# Patient Record
Sex: FEMALE | Race: WHITE | NOT HISPANIC OR LATINO | Employment: OTHER | URBAN - METROPOLITAN AREA
[De-identification: names, ages, dates, MRNs, and addresses within clinical notes are randomized per-mention and may not be internally consistent; named-entity substitution may affect disease eponyms.]

---

## 2021-05-25 ENCOUNTER — RECORDS - HEALTHEAST (OUTPATIENT)
Dept: ADMINISTRATIVE | Facility: CLINIC | Age: 81
End: 2021-05-25

## 2022-05-18 ENCOUNTER — OFFICE VISIT (OUTPATIENT)
Dept: PLASTIC SURGERY | Facility: AMBULATORY SURGERY CENTER | Age: 82
End: 2022-05-18
Payer: MEDICARE

## 2022-05-18 VITALS — WEIGHT: 173 LBS | BODY MASS INDEX: 30.65 KG/M2 | HEIGHT: 63 IN

## 2022-05-18 DIAGNOSIS — T85.43XA SILICONE LEAKAGE FROM BREAST IMPLANT, INITIAL ENCOUNTER: Primary | ICD-10-CM

## 2022-05-18 PROCEDURE — 99204 OFFICE O/P NEW MOD 45 MIN: CPT | Performed by: PLASTIC SURGERY

## 2022-05-18 RX ORDER — ROPINIROLE 0.25 MG/1
1 TABLET, FILM COATED ORAL 3 TIMES DAILY
Status: ON HOLD | COMMUNITY
End: 2022-05-26 | Stop reason: DRUGHIGH

## 2022-05-18 RX ORDER — FUROSEMIDE 20 MG
1 TABLET ORAL DAILY
COMMUNITY
Start: 2022-04-25

## 2022-05-18 RX ORDER — IBUPROFEN 600 MG/1
TABLET, FILM COATED ORAL
COMMUNITY

## 2022-05-18 RX ORDER — GABAPENTIN 300 MG/1
300 CAPSULE ORAL 3 TIMES DAILY
COMMUNITY
Start: 2021-05-06

## 2022-05-18 NOTE — PROGRESS NOTES
Chief complaint:  Deflated breast implants after breast reconstruction    History of present illness:  This is a 82 year old lady who presents with deflated bilateral subpectoral implants, after previous history of bilateral breast reconstruction after right breast cancer.  This patient was diagnosed 35 years ago, with right breast cancer.  She underwent bilateral mastectomies followed by placement of bilateral subpectoral tissue expanders that later were transitioned to permanent breast implants.    Approximately 2-months ago patient has noticed that the implants have lost volume and she was complaining of pain on bilateral axillary regions, especially on the left axillary region.    With these symptoms patient was evaluated with a left axillary ultrasound that showed left breast ruptured implant with free silicone in the subpectoral space and also left axillary region.    With these findings and her current symptoms patient has been referred to me for possible bilateral breast implant explantations.  Patient does not wish any implant replacement.    Past medical history:  History reviewed. No pertinent past medical history.     Right breast cancer    Past surgical history:  Bilateral mastectomies, bilateral first stage breast reconstruction with tissue expanders, bilateral second stage breast reconstruction with permanent breast implant in the subpectoral plane.    Allergies:  No known drug allergies    Medications:    Current Outpatient Medications:      furosemide (LASIX) 20 MG tablet, Take 1 tablet by mouth daily, Disp: , Rfl:      gabapentin (NEURONTIN) 300 MG capsule, gabapentin 300 mg capsule, Disp: , Rfl:      ibuprofen (ADVIL/MOTRIN) 600 MG tablet, ibuprofen 600 mg tablet  TAKE 1 TABLET BY MOUTH FOUR TIMES DAILY AS NEEDED FOR PAIN, Disp: , Rfl:      rOPINIRole (REQUIP) 0.25 MG tablet, ropinirole 0.25 mg tablet  TAKE 2 TABLETS BY MOUTH THREE TIMES DAILY., Disp: , Rfl:      TRAZodone (DESYREL) 50 MG tablet,  "Take 50 mg by mouth At Bedtime., Disp: , Rfl:      Alendronate Sodium (FOSAMAX PO), Take  by mouth every 7 days. (Patient not taking: Reported on 5/18/2022), Disp: , Rfl:      carbidopa-levodopa (SINEMET)  MG per tablet, Take 1 tablet by mouth 3 times daily. (Patient not taking: Reported on 5/18/2022), Disp: , Rfl:     Family history:  Mother with breast cancer.    GYN history:  G3, P3    Social History:  Denies tobacco, drinks alcohol occasionally    Review of systems:  General ROS: No complaints or constitutional symptoms  Skin: No complaints or symptoms   Hematologic/Lymphatic: No symptoms or complaints  Psychiatric: No symptoms or complaints  Endocrine: No excessive fatigue, no hypermetabolic symptoms reported  Respiratory ROS: No cough, shortness of breath, or wheezing  Cardiovascular ROS: No chest pain or dyspnea on exertion  Breast ROS: Complains of deflated bilateral breast implants with pain in the left axillary region  Gastrointestinal ROS: No abdominal pain, nausea, diarrhea, or constipation  Musculoskeletal ROS: No recent injuries reported  Neurological ROS: No focal neurologic defects reported.      Physical exam:  Ht 1.6 m (5' 3\")   Wt 78.5 kg (173 lb)   BMI 30.65 kg/m    General: Alert, cooperative, appears stated age   Skin: Skin color, texture, turgor normal, no rashes or lesions   Lymphatic: No obvious adenopathy, no swelling   Eyes: No scleral icterus, pupils equal  HENT: No traumatic injury to the head or face, no gross abnormalities  Lungs: Normal respiratory effort, breath sounds equal bilaterally  Heart: Regular rate and rhythm  Breasts: Bilateral reconstructed breasts that are completely asymmetrical to each other.  There is absence of bilateral nipple areolar complexes.  The right reconstructed breast implant presents with more volume compared to the left reconstructed breast that looks quite deflated with excessive loose and redundant skin, especially in the inferior pole of the " left reconstructed breast.  The breast implants are subpectoral bilaterally with quite obvious animation deformity.  Both implants present with Baker 2 capsular contracture.  They present with well-healed transverse mastectomy scars.  There were no palpable axillary lymphadenopathies bilaterally.  There is no peau d'orange.  Abdomen: Soft, non-distended and non-tender to palpation  Neurologic: Grossly intact                              ASSESSMENT:    This is a 82 year old with history of right breast cancer status post implant-based subpectoral breast reconstruction, now presenting with bilateral deflated and ruptured breast implants.     Patient does not wish any reconstruction.  Patient would like to have these implants removed.     PLAN:     Patient will be scheduled for bilateral breast implant explantations, with possible capsulectomies bilaterally and most likely excision of redundant skin after explantation.    I will obtain preoperative cardiac risk assessment as well as bilateral breast ultrasounds due to the fact that she only had a left axillary ultrasound.    I have explained to the patient the risks of surgery and they include but are not limited to: scarring, infection, bleeding, hematoma, seroma, the fact that she will not have any volume whatsoever on the anterior aspect of her chest secondary to the fact that she will have explantation of both breast implants.  Patient has  acknowledge all these risks and has agreed to proceed.    Time spent with the patient 45 minutes.      Regino Ramirez MD, FACS   Diplomate American Board of Plastic Surgery  Diplomate American Board of Surgery  [facility]  Division of Plastic & Reconstructive Surgery  Office: [phone]   5/18/2022 at 3:59 PM

## 2022-05-18 NOTE — H&P (VIEW-ONLY)
Chief complaint:  Deflated breast implants after breast reconstruction    History of present illness:  This is a 82 year old lady who presents with deflated bilateral subpectoral implants, after previous history of bilateral breast reconstruction after right breast cancer.  This patient was diagnosed 35 years ago, with right breast cancer.  She underwent bilateral mastectomies followed by placement of bilateral subpectoral tissue expanders that later were transitioned to permanent breast implants.    Approximately 2-months ago patient has noticed that the implants have lost volume and she was complaining of pain on bilateral axillary regions, especially on the left axillary region.    With these symptoms patient was evaluated with a left axillary ultrasound that showed left breast ruptured implant with free silicone in the subpectoral space and also left axillary region.    With these findings and her current symptoms patient has been referred to me for possible bilateral breast implant explantations.  Patient does not wish any implant replacement.    Past medical history:  History reviewed. No pertinent past medical history.     Right breast cancer    Past surgical history:  Bilateral mastectomies, bilateral first stage breast reconstruction with tissue expanders, bilateral second stage breast reconstruction with permanent breast implant in the subpectoral plane.    Allergies:  No known drug allergies    Medications:    Current Outpatient Medications:      furosemide (LASIX) 20 MG tablet, Take 1 tablet by mouth daily, Disp: , Rfl:      gabapentin (NEURONTIN) 300 MG capsule, gabapentin 300 mg capsule, Disp: , Rfl:      ibuprofen (ADVIL/MOTRIN) 600 MG tablet, ibuprofen 600 mg tablet  TAKE 1 TABLET BY MOUTH FOUR TIMES DAILY AS NEEDED FOR PAIN, Disp: , Rfl:      rOPINIRole (REQUIP) 0.25 MG tablet, ropinirole 0.25 mg tablet  TAKE 2 TABLETS BY MOUTH THREE TIMES DAILY., Disp: , Rfl:      TRAZodone (DESYREL) 50 MG tablet,  "Take 50 mg by mouth At Bedtime., Disp: , Rfl:      Alendronate Sodium (FOSAMAX PO), Take  by mouth every 7 days. (Patient not taking: Reported on 5/18/2022), Disp: , Rfl:      carbidopa-levodopa (SINEMET)  MG per tablet, Take 1 tablet by mouth 3 times daily. (Patient not taking: Reported on 5/18/2022), Disp: , Rfl:     Family history:  Mother with breast cancer.    GYN history:  G3, P3    Social History:  Denies tobacco, drinks alcohol occasionally    Review of systems:  General ROS: No complaints or constitutional symptoms  Skin: No complaints or symptoms   Hematologic/Lymphatic: No symptoms or complaints  Psychiatric: No symptoms or complaints  Endocrine: No excessive fatigue, no hypermetabolic symptoms reported  Respiratory ROS: No cough, shortness of breath, or wheezing  Cardiovascular ROS: No chest pain or dyspnea on exertion  Breast ROS: Complains of deflated bilateral breast implants with pain in the left axillary region  Gastrointestinal ROS: No abdominal pain, nausea, diarrhea, or constipation  Musculoskeletal ROS: No recent injuries reported  Neurological ROS: No focal neurologic defects reported.      Physical exam:  Ht 1.6 m (5' 3\")   Wt 78.5 kg (173 lb)   BMI 30.65 kg/m    General: Alert, cooperative, appears stated age   Skin: Skin color, texture, turgor normal, no rashes or lesions   Lymphatic: No obvious adenopathy, no swelling   Eyes: No scleral icterus, pupils equal  HENT: No traumatic injury to the head or face, no gross abnormalities  Lungs: Normal respiratory effort, breath sounds equal bilaterally  Heart: Regular rate and rhythm  Breasts: Bilateral reconstructed breasts that are completely asymmetrical to each other.  There is absence of bilateral nipple areolar complexes.  The right reconstructed breast implant presents with more volume compared to the left reconstructed breast that looks quite deflated with excessive loose and redundant skin, especially in the inferior pole of the " left reconstructed breast.  The breast implants are subpectoral bilaterally with quite obvious animation deformity.  Both implants present with Baker 2 capsular contracture.  They present with well-healed transverse mastectomy scars.  There were no palpable axillary lymphadenopathies bilaterally.  There is no peau d'orange.  Abdomen: Soft, non-distended and non-tender to palpation  Neurologic: Grossly intact                              ASSESSMENT:    This is a 82 year old with history of right breast cancer status post implant-based subpectoral breast reconstruction, now presenting with bilateral deflated and ruptured breast implants.     Patient does not wish any reconstruction.  Patient would like to have these implants removed.     PLAN:     Patient will be scheduled for bilateral breast implant explantations, with possible capsulectomies bilaterally and most likely excision of redundant skin after explantation.    I will obtain preoperative cardiac risk assessment as well as bilateral breast ultrasounds due to the fact that she only had a left axillary ultrasound.    I have explained to the patient the risks of surgery and they include but are not limited to: scarring, infection, bleeding, hematoma, seroma, the fact that she will not have any volume whatsoever on the anterior aspect of her chest secondary to the fact that she will have explantation of both breast implants.  Patient has  acknowledge all these risks and has agreed to proceed.    Time spent with the patient 45 minutes.      Regino Ramirez MD, FACS   Diplomate American Board of Plastic Surgery  Diplomate American Board of Surgery  [facility]  Division of Plastic & Reconstructive Surgery  Office: [phone]   5/18/2022 at 3:59 PM

## 2022-05-18 NOTE — Clinical Note
Hi ladies,  Please submit PA and schedule case as soon as possible. Patient wishes to have this surgery very soon.  Thanks so much,  ROBERT

## 2022-05-18 NOTE — LETTER
5/18/2022         RE: Danna Adams  [address] [id number]        Dear Colleague,    Thank you for referring your patient, Danna Adams, to the [facility]. Please see a copy of my visit note below.    Chief complaint:  Deflated breast implants after breast reconstruction    History of present illness:  This is a 82 year old lady who presents with deflated bilateral subpectoral implants, after previous history of bilateral breast reconstruction after right breast cancer.  This patient was diagnosed 35 years ago, with right breast cancer.  She underwent bilateral mastectomies followed by placement of bilateral subpectoral tissue expanders that later were transitioned to permanent breast implants.    Approximately 2-months ago patient has noticed that the implants have lost volume and she was complaining of pain on bilateral axillary regions, especially on the left axillary region.    With these symptoms patient was evaluated with a left axillary ultrasound that showed left breast ruptured implant with free silicone in the subpectoral space and also left axillary region.    With these findings and her current symptoms patient has been referred to me for possible bilateral breast implant explantations.  Patient does not wish any implant replacement.    Past medical history:  History reviewed. No pertinent past medical history.     Right breast cancer    Past surgical history:  Bilateral mastectomies, bilateral first stage breast reconstruction with tissue expanders, bilateral second stage breast reconstruction with permanent breast implant in the subpectoral plane.    Allergies:  No known drug allergies    Medications:    Current Outpatient Medications:      furosemide (LASIX) 20 MG tablet, Take 1 tablet by mouth daily, Disp: , Rfl:      gabapentin (NEURONTIN) 300 MG capsule, gabapentin 300 mg capsule, Disp: , Rfl:      ibuprofen (ADVIL/MOTRIN) 600 MG  "tablet, ibuprofen 600 mg tablet  TAKE 1 TABLET BY MOUTH FOUR TIMES DAILY AS NEEDED FOR PAIN, Disp: , Rfl:      rOPINIRole (REQUIP) 0.25 MG tablet, ropinirole 0.25 mg tablet  TAKE 2 TABLETS BY MOUTH THREE TIMES DAILY., Disp: , Rfl:      TRAZodone (DESYREL) 50 MG tablet, Take 50 mg by mouth At Bedtime., Disp: , Rfl:      Alendronate Sodium (FOSAMAX PO), Take  by mouth every 7 days. (Patient not taking: Reported on 5/18/2022), Disp: , Rfl:      carbidopa-levodopa (SINEMET)  MG per tablet, Take 1 tablet by mouth 3 times daily. (Patient not taking: Reported on 5/18/2022), Disp: , Rfl:     Family history:  Mother with breast cancer.    GYN history:  G3, P3    Social History:  Denies tobacco, drinks alcohol occasionally    Review of systems:  General ROS: No complaints or constitutional symptoms  Skin: No complaints or symptoms   Hematologic/Lymphatic: No symptoms or complaints  Psychiatric: No symptoms or complaints  Endocrine: No excessive fatigue, no hypermetabolic symptoms reported  Respiratory ROS: No cough, shortness of breath, or wheezing  Cardiovascular ROS: No chest pain or dyspnea on exertion  Breast ROS: Complains of deflated bilateral breast implants with pain in the left axillary region  Gastrointestinal ROS: No abdominal pain, nausea, diarrhea, or constipation  Musculoskeletal ROS: No recent injuries reported  Neurological ROS: No focal neurologic defects reported.      Physical exam:  Ht 1.6 m (5' 3\")   Wt 78.5 kg (173 lb)   BMI 30.65 kg/m    General: Alert, cooperative, appears stated age   Skin: Skin color, texture, turgor normal, no rashes or lesions   Lymphatic: No obvious adenopathy, no swelling   Eyes: No scleral icterus, pupils equal  HENT: No traumatic injury to the head or face, no gross abnormalities  Lungs: Normal respiratory effort, breath sounds equal bilaterally  Heart: Regular rate and rhythm  Breasts: Bilateral reconstructed breasts that are completely asymmetrical to each other.  " There is absence of bilateral nipple areolar complexes.  The right reconstructed breast implant presents with more volume compared to the left reconstructed breast that looks quite deflated with excessive loose and redundant skin, especially in the inferior pole of the left reconstructed breast.  The breast implants are subpectoral bilaterally with quite obvious animation deformity.  Both implants present with Baker 2 capsular contracture.  They present with well-healed transverse mastectomy scars.  There were no palpable axillary lymphadenopathies bilaterally.  There is no peau d'orange.  Abdomen: Soft, non-distended and non-tender to palpation  Neurologic: Grossly intact                              ASSESSMENT:    This is a 82 year old with history of right breast cancer status post implant-based subpectoral breast reconstruction, now presenting with bilateral deflated and ruptured breast implants.     Patient does not wish any reconstruction.  Patient would like to have these implants removed.     PLAN:     Patient will be scheduled for bilateral breast implant explantations, with possible capsulectomies bilaterally and most likely excision of redundant skin after explantation.    I will obtain preoperative cardiac risk assessment as well as bilateral breast ultrasounds due to the fact that she only had a left axillary ultrasound.    I have explained to the patient the risks of surgery and they include but are not limited to: scarring, infection, bleeding, hematoma, seroma, the fact that she will not have any volume whatsoever on the anterior aspect of her chest secondary to the fact that she will have explantation of both breast implants.  Patient has  acknowledge all these risks and has agreed to proceed.    Time spent with the patient 45 minutes.      Regino Ramirez MD, FACS   Diplomate American Board of Plastic Surgery  Diplomate American Board of Surgery  [facility]  Division of  Plastic & Reconstructive Surgery  Office: [phone]   5/18/2022 at 3:59 PM        Again, thank you for allowing me to participate in the care of your patient.        Sincerely,        Regino Ramirez MD

## 2022-05-19 ENCOUNTER — TELEPHONE (OUTPATIENT)
Dept: PLASTIC SURGERY | Facility: AMBULATORY SURGERY CENTER | Age: 82
End: 2022-05-19
Payer: MEDICARE

## 2022-05-19 DIAGNOSIS — Z11.59 ENCOUNTER FOR SCREENING FOR OTHER VIRAL DISEASES: Primary | ICD-10-CM

## 2022-05-19 NOTE — TELEPHONE ENCOUNTER
Pt called back.  She is excited to get done next week and can make all dates work.  To summarize:  *5/20- Cardiac appt.  *5/20-Preop Physical-she will schedule this and is sure she can get in.  *5/23-Covid Pre Proc Test   *5/23-Bilateral Breast U/S  *5/26-Surgery  *6/2-Post op appt w/Dr Ramirez    She expressed understanding of the need for a /adult to stay 24hour postop with her.    She had no additional question. Will send detailed patient instructions by personal email to pt at her request.    Pt will call if any additional questions arise.      ARASELI

## 2022-05-19 NOTE — TELEPHONE ENCOUNTER
Left patient a message to call back.  Dr Ramirez said this pt wants to be scheduled asap, and because we've had a cancellation we can do on Thursday 5/26.   She does have cardiac clearance scheduled for tomorrow, I scheduled Covid test for Monday, and did advise she immediately contact her primary physician to schedule her preop physical, hopefully for tomorrow or Monday. She also needs bilateral breast ultrasound which is scheduled at 1:45pm Monday 5/23.  I asked her to call me back asap and provided my direct line.         [facility]     [facility]  Surgery Clinic [facility]  [facility] Clinic - [facility]  [address] [place] [id number]

## 2022-05-19 NOTE — PATIENT INSTRUCTIONS
We've received instruction to get you scheduled for bilateral explantation breast implants with Dr Ramirez. We have that arranged as follows:     Surgery Date: May 26 2022  Location:  [place] [address]   Arrival Time: 8am  (unless instructed otherwise by the pre-op nurse)      Additional Appointments:    Cardiac Clearance: Scheduled 5/20/2022  Pre-op Physical- You are scheduling this and trying for 5/20/2022  Covid-19 Pre Procedure Test: 5/23/2022 at 10:15am  Bilateral Breast Ultrasound: 5/23/2022 at 1:45pm (they are trying to move this closer to the lab appointment to save you a trip)  Post op Appointment: 6/2/2022 at 9:15am with Dr Ramirez    Prep Instructions:     Please schedule a pre-op physical with your primary care doctor. Call them right away to schedule this.    PCR-Rated COVID19 testing is required within 4 days of surgery. We have this scheduled for you at [facility], [address] [place] [address], [facility] Floor on May 23 at 10:15am . Follow the specific instructions you receive by Meenakshi. If your test is positive, your surgery will be canceled.     Follow fasting instructions provided by the preop nurse to prevent cancellation.  The preop nurse will provide your actual arrival time and can answer any general questions you may have.  They typically call you the day before surgery.    Your surgeon prefers no blood thinners including aspirin for one week prior to surgery but you must verify this is safe for you with your prescribing doctor before stopping.     Visitor restrictions are in place due to the pandemic. One visitor is allowed for adult surgical patients. Please verify this with the pre-op nurse when they call before surgery because it is subject to change.    If you are going home the same day of surgery, you need an adult to drive you home and stay with you 24 hours after surgery.  You cannot use public transportation or medi cab services.    You will be screened  for high-risk exposure to Covid-19 during the pre-op call.  We encourage you to quarantine yourself away from any Covid-19 people for 10 days before surgery to avoid possible last minute cancellations.   When you arrive to the hospital, you will again be screened for COVID19 symptoms. If you screen positive, your surgery will need to be postponed.     The community is experiencing a surge in COVID19 hospital admissions which is impacting bed availability at all hospitals. If you are being admitted overnight or longer following surgery, please be aware that your procedure could be cancelled as a result.     We always encourage you to notify your insurance any time you have something scheduled including surgery. The number is usually right on the back of your insurance card. Please call [facility] Cost of Care at [phone] if you need pricing information.       Call our office if you have any questions! Thank you!

## 2022-05-20 ENCOUNTER — OFFICE VISIT (OUTPATIENT)
Dept: CARDIOLOGY | Facility: CLINIC | Age: 82
End: 2022-05-20
Payer: MEDICARE

## 2022-05-20 VITALS
WEIGHT: 172 LBS | HEART RATE: 74 BPM | RESPIRATION RATE: 16 BRPM | BODY MASS INDEX: 30.48 KG/M2 | DIASTOLIC BLOOD PRESSURE: 86 MMHG | SYSTOLIC BLOOD PRESSURE: 132 MMHG | HEIGHT: 63 IN

## 2022-05-20 DIAGNOSIS — T85.43XA SILICONE LEAKAGE FROM BREAST IMPLANT, INITIAL ENCOUNTER: ICD-10-CM

## 2022-05-20 DIAGNOSIS — Z01.810 PRE-OPERATIVE CARDIOVASCULAR EXAMINATION: Primary | ICD-10-CM

## 2022-05-20 PROCEDURE — 93000 ELECTROCARDIOGRAM COMPLETE: CPT | Performed by: INTERNAL MEDICINE

## 2022-05-20 PROCEDURE — 99204 OFFICE O/P NEW MOD 45 MIN: CPT | Performed by: INTERNAL MEDICINE

## 2022-05-20 NOTE — LETTER
2022    David Dennis MD  [address]   [facility] [address]    RE: Danna Adams       Dear Colleague,     I had the pleasure of seeing Danna Adams in the [facility].    HEART CARE ENCOUNTER CONSULTATON NOTE      [facility]  [phone]      Assessment/Recommendations   Assessment:   1.  Preoperative cardiovascular surgical evaluation for reconstructive breast surgery.  2.  History of breast cancer  3.  Premature atrial contractions  4.  Mild lower extremity edema, chronic    1.  Preoperative cardiovascular risk assessment:   Assessment of preoperative cardiac risk: He has no active cardiac conditions (unstable coronary syndromes, decompensated HF, significant arrhythmias, or severe valvular disease), has no known coronary artery disease, has no clinical risk factors (ischemic heart disease, prior heart failure, cerebrovascular disease, diabetes mellitus, and renal insufficiency), and has a functional capacity > than 4 METs.     Normal renal function:     Surgical Recommendation(s):   1. Based on clinical risk and cardiac condition it is recommended that the patient proceeds with operation with no further cardiac testing or interventions at this time.   2.  Premature atrial contractions are asymptomatic do not need treatment  3.  No indication for beta-blocker  4.  No indication for statin  5.  Continue as needed Lasix for lower extremity edema.  No history of congestive heart failure.  Normal BNP level in past.     Twelve-lead EK2022.  Personally viewed.  normal sinus rhythm.  Frequent premature atrial contractions.  Ventricular rate 75 bpm.  OH interval normal at 150 ms.  QRS duration 84 ms.  QTc interval 408 ms.  Other than PACs normal EKG.    Today's clinic visit entailed:  Review of prior external note(s) from - CareEverywhere information from PCP reviewed  Review of the result(s) of each unique test -  Labs  The following tests were independently interpreted by me as noted in my documentation: ECG  Ordering of each unique test  Prescription drug management  The level of medical decision making during this visit was of moderate complexity.         History of Present Illness/Subjective    HPI: Danna Adams is a 82 year old female no prior significant cardiac history who presents to [facility] cardiology clinic for preoperative cardiovascular surgical risk assessment.    Per the patient she is undergoing reconstructive breast surgery due to related to pain at her breast implants after reconstruction from breast cancer over 30 years ago.      Currently the patient denies any anginal chest pain, denies any progressive dyspnea on exertion, no syncope no palpitations.  She has no history of coronary artery disease.  She has a history of valvular heart disease.  There are no clinically significant murmurs on examination.  She denies any claudication symptoms.  She has a history of normal renal function.    She is able to walk at a relatively brisk pace with no symptoms.  Usually when ambulating stairs she does have some mild dyspnea which been chronic and nonprogressive.  She denies any orthopnea or PND symptoms.  She denies any lightheadedness with prolonged standing.    No prior cardiac history.  No prior cardiac surgery.  No history of congestive heart failure no prior cardiac stents.  No history of cardiac arrhythmias.  He is not on a beta-blocker or statin therapy.  Takes occasional Lasix as needed for lower extremity edema which is only affecting her right lower ankle.    No prior cardiac complications to general anesthesia.    Most recent labs were reviewed.  In 2021 she had normal renal function.  She had a normal BNP level at the time that she had an mild swelling in her right ankle.  Reviewed labs from [facility].    Reviewed outpatient note by surgical team.       Physical Examination  Review  "of Systems   Vitals: /86 (BP Location: Left arm, Patient Position: Sitting, Cuff Size: Adult Regular)   Pulse 74   Resp 16   Ht 1.6 m (5' 3\")   Wt 78 kg (172 lb)   BMI 30.47 kg/m    BMI= Body mass index is 30.47 kg/m .  Wt Readings from Last 3 Encounters:   05/20/22 78 kg (172 lb)   05/18/22 78.5 kg (173 lb)   04/07/16 65.8 kg (145 lb)           ENT/Mouth: membranes moist, no oral lesions or bleeding gums.      EYES:  no scleral icterus, normal conjunctivae       Chest/Lungs:   lungs are clear to auscultation, no rales or wheezing, no sternal scar, equal chest wall expansion    Cardiovascular:   Regular with ectopic beats. Normal first and second heart sounds with no murmurs, rubs, or gallops; the carotid, radial and posterior tibial pulses are intact, Jugular venous pressure normal, no pitting edema bilaterally    Abdomen:  no tenderness; bowel sounds are present   Extremities: no cyanosis or clubbing   Skin: no xanthelasma, warm.    Neurologic: normal  bilateral, no tremors     Psychiatric: alert and oriented x3, calm        Please refer above for cardiac ROS details.        Medical History  Surgical History Family History Social History   No past medical history on file.  Past Surgical History:   Procedure Laterality Date     FOOT SURGERY Right      JOINT REPLACEMENT Right     hip     MASTECTOMY Bilateral      ZZC TOTAL KNEE ARTHROPLASTY Left 4/12/2016    Procedure: KNEE TOTAL ARTHROPLASTY, LEFT;  Surgeon: Joseluis Smiley MD;  Location: [facility];  Service: Orthopedics     Family history of sudden cardiac death.   Social History     Socioeconomic History     Marital status:      Spouse name: Not on file     Number of children: Not on file     Years of education: Not on file     Highest education level: Not on file   Occupational History     Not on file   Tobacco Use     Smoking status: Never Smoker     Smokeless tobacco: Never Used   Substance and Sexual Activity     Alcohol use: Yes "     Drug use: Never     Sexual activity: Not on file   Other Topics Concern     Not on file   Social History Narrative     Not on file     Social Determinants of Health     Financial Resource Strain: Not on file   Food Insecurity: Not on file   Transportation Needs: Not on file   Physical Activity: Not on file   Stress: Not on file   Social Connections: Not on file   Intimate Partner Violence: Not on file   Housing Stability: Not on file           Medications  Allergies   Current Outpatient Medications   Medication Sig Dispense Refill     furosemide (LASIX) 20 MG tablet Take 1 tablet by mouth daily       gabapentin (NEURONTIN) 300 MG capsule gabapentin 300 mg capsule       ibuprofen (ADVIL/MOTRIN) 600 MG tablet ibuprofen 600 mg tablet   TAKE 1 TABLET BY MOUTH FOUR TIMES DAILY AS NEEDED FOR PAIN       rOPINIRole (REQUIP) 0.25 MG tablet ropinirole 0.25 mg tablet   TAKE 2 TABLETS BY MOUTH THREE TIMES DAILY.       TRAZodone (DESYREL) 50 MG tablet Take 50 mg by mouth At Bedtime.       Alendronate Sodium (FOSAMAX PO) Take  by mouth every 7 days. (Patient not taking: No sig reported)       carbidopa-levodopa (SINEMET)  MG per tablet Take 1 tablet by mouth 3 times daily. (Patient not taking: No sig reported)       No Known Allergies       Lab Results    Chemistry/lipid CBC Cardiac Enzymes/BNP/TSH/INR   No results for input(s): CHOL, HDL, LDL, TRIG, CHOLHDLRATIO in the last 38120 hours.  No results for input(s): LDL in the last 49765 hours.  Recent Labs   Lab Test 04/14/16  0533   CR 0.63   GFRESTIMATED >60     Recent Labs   Lab Test 04/14/16  0533 04/13/16  0600 04/12/16  0737   CR 0.63 0.64 0.65     No results for input(s): A1C in the last 82786 hours.       No results for input(s): WBC, HGB, HCT, MCV, PLT in the last 42521 hours.  No results for input(s): HGB in the last 54909 hours. No results for input(s): TROPONINI in the last 81911 hours.  No results for input(s): BNP, NTBNPI, NTBNP in the last 63608 hours.  No  results for input(s): TSH in the last 95016 hours.  No results for input(s): INR in the last 34433 hours.     Dk Solis DO     Thank you for allowing me to participate in the care of your patient.      Sincerely,     Dk Solis DO     [facility]  cc:   Regino Ramirez MD  [address] [address]

## 2022-05-20 NOTE — PROGRESS NOTES
HEART CARE ENCOUNTER CONSULTATON NOTE      M [facility]  [phone]      Assessment/Recommendations   Assessment:   1.  Preoperative cardiovascular surgical evaluation for reconstructive breast surgery.  2.  History of breast cancer  3.  Premature atrial contractions  4.  Mild lower extremity edema, chronic    1.  Preoperative cardiovascular risk assessment:   Assessment of preoperative cardiac risk: He has no active cardiac conditions (unstable coronary syndromes, decompensated HF, significant arrhythmias, or severe valvular disease), has no known coronary artery disease, has no clinical risk factors (ischemic heart disease, prior heart failure, cerebrovascular disease, diabetes mellitus, and renal insufficiency), and has a functional capacity > than 4 METs.     Normal renal function:     Surgical Recommendation(s):   1. Based on clinical risk and cardiac condition it is recommended that the patient proceeds with operation with no further cardiac testing or interventions at this time.   2.  Premature atrial contractions are asymptomatic do not need treatment  3.  No indication for beta-blocker  4.  No indication for statin  5.  Continue as needed Lasix for lower extremity edema.  No history of congestive heart failure.  Normal BNP level in past.     Twelve-lead EK2022.  Personally viewed.  normal sinus rhythm.  Frequent premature atrial contractions.  Ventricular rate 75 bpm.  OK interval normal at 150 ms.  QRS duration 84 ms.  QTc interval 408 ms.  Other than PACs normal EKG.    Today's clinic visit entailed:  Review of prior external note(s) from - CareEverywhere information from PCP reviewed  Review of the result(s) of each unique test - Labs  The following tests were independently interpreted by me as noted in my documentation: ECG  Ordering of each unique test  Prescription drug management  The level of medical decision making during this visit was of moderate complexity.      "    History of Present Illness/Subjective    HPI: Danna Adams is a 82 year old female no prior significant cardiac history who presents to [facility] cardiology clinic for preoperative cardiovascular surgical risk assessment.    Per the patient she is undergoing reconstructive breast surgery due to related to pain at her breast implants after reconstruction from breast cancer over 30 years ago.      Currently the patient denies any anginal chest pain, denies any progressive dyspnea on exertion, no syncope no palpitations.  She has no history of coronary artery disease.  She has a history of valvular heart disease.  There are no clinically significant murmurs on examination.  She denies any claudication symptoms.  She has a history of normal renal function.    She is able to walk at a relatively brisk pace with no symptoms.  Usually when ambulating stairs she does have some mild dyspnea which been chronic and nonprogressive.  She denies any orthopnea or PND symptoms.  She denies any lightheadedness with prolonged standing.    No prior cardiac history.  No prior cardiac surgery.  No history of congestive heart failure no prior cardiac stents.  No history of cardiac arrhythmias.  He is not on a beta-blocker or statin therapy.  Takes occasional Lasix as needed for lower extremity edema which is only affecting her right lower ankle.    No prior cardiac complications to general anesthesia.    Most recent labs were reviewed.  In 2021 she had normal renal function.  She had a normal BNP level at the time that she had an mild swelling in her right ankle.  Reviewed labs from [facility].    Reviewed outpatient note by surgical team.       Physical Examination  Review of Systems   Vitals: /86 (BP Location: Left arm, Patient Position: Sitting, Cuff Size: Adult Regular)   Pulse 74   Resp 16   Ht 1.6 m (5' 3\")   Wt 78 kg (172 lb)   BMI 30.47 kg/m    BMI= Body mass index is 30.47 kg/m .  Wt Readings " from Last 3 Encounters:   05/20/22 78 kg (172 lb)   05/18/22 78.5 kg (173 lb)   04/07/16 65.8 kg (145 lb)           ENT/Mouth: membranes moist, no oral lesions or bleeding gums.      EYES:  no scleral icterus, normal conjunctivae       Chest/Lungs:   lungs are clear to auscultation, no rales or wheezing, no sternal scar, equal chest wall expansion    Cardiovascular:   Regular with ectopic beats. Normal first and second heart sounds with no murmurs, rubs, or gallops; the carotid, radial and posterior tibial pulses are intact, Jugular venous pressure normal, no pitting edema bilaterally    Abdomen:  no tenderness; bowel sounds are present   Extremities: no cyanosis or clubbing   Skin: no xanthelasma, warm.    Neurologic: normal  bilateral, no tremors     Psychiatric: alert and oriented x3, calm        Please refer above for cardiac ROS details.        Medical History  Surgical History Family History Social History   No past medical history on file.  Past Surgical History:   Procedure Laterality Date     FOOT SURGERY Right      JOINT REPLACEMENT Right     hip     MASTECTOMY Bilateral      ZZC TOTAL KNEE ARTHROPLASTY Left 4/12/2016    Procedure: KNEE TOTAL ARTHROPLASTY, LEFT;  Surgeon: Joseluis Smiley MD;  Location: [facility];  Service: Orthopedics     Family history of sudden cardiac death.   Social History     Socioeconomic History     Marital status:      Spouse name: Not on file     Number of children: Not on file     Years of education: Not on file     Highest education level: Not on file   Occupational History     Not on file   Tobacco Use     Smoking status: Never Smoker     Smokeless tobacco: Never Used   Substance and Sexual Activity     Alcohol use: Yes     Drug use: Never     Sexual activity: Not on file   Other Topics Concern     Not on file   Social History Narrative     Not on file     Social Determinants of Health     Financial Resource Strain: Not on file   Food Insecurity: Not on file    Transportation Needs: Not on file   Physical Activity: Not on file   Stress: Not on file   Social Connections: Not on file   Intimate Partner Violence: Not on file   Housing Stability: Not on file           Medications  Allergies   Current Outpatient Medications   Medication Sig Dispense Refill     furosemide (LASIX) 20 MG tablet Take 1 tablet by mouth daily       gabapentin (NEURONTIN) 300 MG capsule gabapentin 300 mg capsule       ibuprofen (ADVIL/MOTRIN) 600 MG tablet ibuprofen 600 mg tablet   TAKE 1 TABLET BY MOUTH FOUR TIMES DAILY AS NEEDED FOR PAIN       rOPINIRole (REQUIP) 0.25 MG tablet ropinirole 0.25 mg tablet   TAKE 2 TABLETS BY MOUTH THREE TIMES DAILY.       TRAZodone (DESYREL) 50 MG tablet Take 50 mg by mouth At Bedtime.       Alendronate Sodium (FOSAMAX PO) Take  by mouth every 7 days. (Patient not taking: No sig reported)       carbidopa-levodopa (SINEMET)  MG per tablet Take 1 tablet by mouth 3 times daily. (Patient not taking: No sig reported)       No Known Allergies       Lab Results    Chemistry/lipid CBC Cardiac Enzymes/BNP/TSH/INR   No results for input(s): CHOL, HDL, LDL, TRIG, CHOLHDLRATIO in the last 80544 hours.  No results for input(s): LDL in the last 42989 hours.  Recent Labs   Lab Test 04/14/16  0533   CR 0.63   GFRESTIMATED >60     Recent Labs   Lab Test 04/14/16  0533 04/13/16  0600 04/12/16  0737   CR 0.63 0.64 0.65     No results for input(s): A1C in the last 90585 hours.       No results for input(s): WBC, HGB, HCT, MCV, PLT in the last 86066 hours.  No results for input(s): HGB in the last 73793 hours. No results for input(s): TROPONINI in the last 94964 hours.  No results for input(s): BNP, NTBNPI, NTBNP in the last 38647 hours.  No results for input(s): TSH in the last 99446 hours.  No results for input(s): INR in the last 95968 hours.     Dk Solis, DO

## 2022-05-21 LAB
ATRIAL RATE - MUSE: 75 BPM
DIASTOLIC BLOOD PRESSURE - MUSE: NORMAL MMHG
INTERPRETATION ECG - MUSE: NORMAL
P AXIS - MUSE: 32 DEGREES
PR INTERVAL - MUSE: 150 MS
QRS DURATION - MUSE: 84 MS
QT - MUSE: 366 MS
QTC - MUSE: 408 MS
R AXIS - MUSE: 23 DEGREES
SYSTOLIC BLOOD PRESSURE - MUSE: NORMAL MMHG
T AXIS - MUSE: 61 DEGREES
VENTRICULAR RATE- MUSE: 75 BPM

## 2022-05-23 ENCOUNTER — LAB (OUTPATIENT)
Dept: LAB | Facility: CLINIC | Age: 82
End: 2022-05-23
Payer: MEDICARE

## 2022-05-23 ENCOUNTER — ANCILLARY PROCEDURE (OUTPATIENT)
Dept: MAMMOGRAPHY | Facility: CLINIC | Age: 82
End: 2022-05-23
Attending: PLASTIC SURGERY
Payer: MEDICARE

## 2022-05-23 DIAGNOSIS — Z11.59 ENCOUNTER FOR SCREENING FOR OTHER VIRAL DISEASES: ICD-10-CM

## 2022-05-23 DIAGNOSIS — T85.43XA SILICONE LEAKAGE FROM BREAST IMPLANT, INITIAL ENCOUNTER: ICD-10-CM

## 2022-05-23 LAB — SARS-COV-2 RNA RESP QL NAA+PROBE: NEGATIVE

## 2022-05-23 PROCEDURE — 76642 ULTRASOUND BREAST LIMITED: CPT | Mod: 50

## 2022-05-23 PROCEDURE — U0003 INFECTIOUS AGENT DETECTION BY NUCLEIC ACID (DNA OR RNA); SEVERE ACUTE RESPIRATORY SYNDROME CORONAVIRUS 2 (SARS-COV-2) (CORONAVIRUS DISEASE [COVID-19]), AMPLIFIED PROBE TECHNIQUE, MAKING USE OF HIGH THROUGHPUT TECHNOLOGIES AS DESCRIBED BY CMS-2020-01-R: HCPCS

## 2022-05-23 PROCEDURE — U0005 INFEC AGEN DETEC AMPLI PROBE: HCPCS

## 2022-05-26 ENCOUNTER — HOSPITAL ENCOUNTER (OUTPATIENT)
Facility: HOSPITAL | Age: 82
Discharge: HOME | End: 2022-05-27
Attending: PLASTIC SURGERY | Admitting: PLASTIC SURGERY
Payer: MEDICARE

## 2022-05-26 ENCOUNTER — ANESTHESIA (OUTPATIENT)
Dept: SURGERY | Facility: HOSPITAL | Age: 82
End: 2022-05-26
Payer: MEDICARE

## 2022-05-26 ENCOUNTER — ANESTHESIA EVENT (OUTPATIENT)
Dept: SURGERY | Facility: HOSPITAL | Age: 82
End: 2022-05-26
Payer: MEDICARE

## 2022-05-26 DIAGNOSIS — Z90.13 STATUS POST BILATERAL MASTECTOMY: Primary | ICD-10-CM

## 2022-05-26 PROCEDURE — 250N000013 HC RX MED GY IP 250 OP 250 PS 637: Performed by: ANESTHESIOLOGY

## 2022-05-26 PROCEDURE — 250N000011 HC RX IP 250 OP 636: Performed by: PLASTIC SURGERY

## 2022-05-26 PROCEDURE — 250N000009 HC RX 250: Performed by: PLASTIC SURGERY

## 2022-05-26 PROCEDURE — 250N000011 HC RX IP 250 OP 636: Performed by: ANESTHESIOLOGY

## 2022-05-26 PROCEDURE — 250N000025 HC SEVOFLURANE, PER MIN: Performed by: PLASTIC SURGERY

## 2022-05-26 PROCEDURE — 88311 DECALCIFY TISSUE: CPT | Mod: TC | Performed by: PLASTIC SURGERY

## 2022-05-26 PROCEDURE — 258N000003 HC RX IP 258 OP 636: Performed by: ANESTHESIOLOGY

## 2022-05-26 PROCEDURE — 250N000013 HC RX MED GY IP 250 OP 250 PS 637: Performed by: PLASTIC SURGERY

## 2022-05-26 PROCEDURE — 250N000009 HC RX 250: Performed by: ANESTHESIOLOGY

## 2022-05-26 PROCEDURE — 999N000141 HC STATISTIC PRE-PROCEDURE NURSING ASSESSMENT: Performed by: PLASTIC SURGERY

## 2022-05-26 PROCEDURE — 19330 RMVL RUPTURED BREAST IMPLANT: CPT | Mod: 50 | Performed by: PLASTIC SURGERY

## 2022-05-26 PROCEDURE — 710N000009 HC RECOVERY PHASE 1, LEVEL 1, PER MIN: Performed by: PLASTIC SURGERY

## 2022-05-26 PROCEDURE — 250N000011 HC RX IP 250 OP 636

## 2022-05-26 PROCEDURE — 360N000076 HC SURGERY LEVEL 3, PER MIN: Performed by: PLASTIC SURGERY

## 2022-05-26 PROCEDURE — 258N000003 HC RX IP 258 OP 636: Performed by: PLASTIC SURGERY

## 2022-05-26 PROCEDURE — 272N000001 HC OR GENERAL SUPPLY STERILE: Performed by: PLASTIC SURGERY

## 2022-05-26 PROCEDURE — 370N000017 HC ANESTHESIA TECHNICAL FEE, PER MIN: Performed by: PLASTIC SURGERY

## 2022-05-26 RX ORDER — FENTANYL CITRATE 50 UG/ML
25 INJECTION, SOLUTION INTRAMUSCULAR; INTRAVENOUS EVERY 5 MIN PRN
Status: DISCONTINUED | OUTPATIENT
Start: 2022-05-26 | End: 2022-05-26 | Stop reason: HOSPADM

## 2022-05-26 RX ORDER — SODIUM CHLORIDE, SODIUM LACTATE, POTASSIUM CHLORIDE, CALCIUM CHLORIDE 600; 310; 30; 20 MG/100ML; MG/100ML; MG/100ML; MG/100ML
INJECTION, SOLUTION INTRAVENOUS CONTINUOUS
Status: DISCONTINUED | OUTPATIENT
Start: 2022-05-26 | End: 2022-05-26 | Stop reason: HOSPADM

## 2022-05-26 RX ORDER — ONDANSETRON 2 MG/ML
INJECTION INTRAMUSCULAR; INTRAVENOUS PRN
Status: DISCONTINUED | OUTPATIENT
Start: 2022-05-26 | End: 2022-05-26

## 2022-05-26 RX ORDER — PROPOFOL 10 MG/ML
INJECTION, EMULSION INTRAVENOUS PRN
Status: DISCONTINUED | OUTPATIENT
Start: 2022-05-26 | End: 2022-05-26

## 2022-05-26 RX ORDER — GABAPENTIN 300 MG/1
300 CAPSULE ORAL 3 TIMES DAILY PRN
Status: DISCONTINUED | OUTPATIENT
Start: 2022-05-26 | End: 2022-05-27 | Stop reason: HOSPADM

## 2022-05-26 RX ORDER — DEXTROSE MONOHYDRATE, SODIUM CHLORIDE, AND POTASSIUM CHLORIDE 50; 1.49; 4.5 G/1000ML; G/1000ML; G/1000ML
INJECTION, SOLUTION INTRAVENOUS CONTINUOUS
Status: DISCONTINUED | OUTPATIENT
Start: 2022-05-26 | End: 2022-05-27 | Stop reason: HOSPADM

## 2022-05-26 RX ORDER — FENTANYL CITRATE 50 UG/ML
50 INJECTION, SOLUTION INTRAMUSCULAR; INTRAVENOUS
Status: DISCONTINUED | OUTPATIENT
Start: 2022-05-26 | End: 2022-05-26 | Stop reason: HOSPADM

## 2022-05-26 RX ORDER — ONDANSETRON 2 MG/ML
4 INJECTION INTRAMUSCULAR; INTRAVENOUS EVERY 6 HOURS PRN
Status: DISCONTINUED | OUTPATIENT
Start: 2022-05-26 | End: 2022-05-27 | Stop reason: HOSPADM

## 2022-05-26 RX ORDER — MAGNESIUM SULFATE 4 G/50ML
4 INJECTION INTRAVENOUS ONCE
Status: COMPLETED | OUTPATIENT
Start: 2022-05-26 | End: 2022-05-26

## 2022-05-26 RX ORDER — AMOXICILLIN 250 MG
1-2 CAPSULE ORAL 2 TIMES DAILY
Qty: 30 TABLET | Refills: 0 | Status: SHIPPED | OUTPATIENT
Start: 2022-05-26

## 2022-05-26 RX ORDER — NALOXONE HYDROCHLORIDE 0.4 MG/ML
0.4 INJECTION, SOLUTION INTRAMUSCULAR; INTRAVENOUS; SUBCUTANEOUS
Status: DISCONTINUED | OUTPATIENT
Start: 2022-05-26 | End: 2022-05-27 | Stop reason: HOSPADM

## 2022-05-26 RX ORDER — HYDROMORPHONE HYDROCHLORIDE 1 MG/ML
0.5 INJECTION, SOLUTION INTRAMUSCULAR; INTRAVENOUS; SUBCUTANEOUS EVERY 5 MIN PRN
Status: DISCONTINUED | OUTPATIENT
Start: 2022-05-26 | End: 2022-05-26 | Stop reason: HOSPADM

## 2022-05-26 RX ORDER — OXYCODONE HYDROCHLORIDE 5 MG/1
10 TABLET ORAL EVERY 4 HOURS PRN
Status: DISCONTINUED | OUTPATIENT
Start: 2022-05-26 | End: 2022-05-27 | Stop reason: HOSPADM

## 2022-05-26 RX ORDER — ROPINIROLE 1 MG/1
1 TABLET, FILM COATED ORAL 3 TIMES DAILY
Status: DISCONTINUED | OUTPATIENT
Start: 2022-05-26 | End: 2022-05-27 | Stop reason: HOSPADM

## 2022-05-26 RX ORDER — PROCHLORPERAZINE MALEATE 5 MG
5 TABLET ORAL EVERY 6 HOURS PRN
Status: DISCONTINUED | OUTPATIENT
Start: 2022-05-26 | End: 2022-05-27 | Stop reason: HOSPADM

## 2022-05-26 RX ORDER — MEPERIDINE HYDROCHLORIDE 25 MG/ML
12.5 INJECTION INTRAMUSCULAR; INTRAVENOUS; SUBCUTANEOUS
Status: DISCONTINUED | OUTPATIENT
Start: 2022-05-26 | End: 2022-05-26 | Stop reason: HOSPADM

## 2022-05-26 RX ORDER — OXYCODONE HYDROCHLORIDE 5 MG/1
5 TABLET ORAL EVERY 4 HOURS PRN
Status: DISCONTINUED | OUTPATIENT
Start: 2022-05-26 | End: 2022-05-27 | Stop reason: HOSPADM

## 2022-05-26 RX ORDER — DEXAMETHASONE SODIUM PHOSPHATE 10 MG/ML
INJECTION, SOLUTION INTRAMUSCULAR; INTRAVENOUS PRN
Status: DISCONTINUED | OUTPATIENT
Start: 2022-05-26 | End: 2022-05-26

## 2022-05-26 RX ORDER — BISACODYL 10 MG
10 SUPPOSITORY, RECTAL RECTAL DAILY PRN
Status: DISCONTINUED | OUTPATIENT
Start: 2022-05-26 | End: 2022-05-27 | Stop reason: HOSPADM

## 2022-05-26 RX ORDER — NALOXONE HYDROCHLORIDE 0.4 MG/ML
0.2 INJECTION, SOLUTION INTRAMUSCULAR; INTRAVENOUS; SUBCUTANEOUS
Status: DISCONTINUED | OUTPATIENT
Start: 2022-05-26 | End: 2022-05-27 | Stop reason: HOSPADM

## 2022-05-26 RX ORDER — LEVOFLOXACIN 5 MG/ML
INJECTION, SOLUTION INTRAVENOUS
Status: DISCONTINUED
Start: 2022-05-26 | End: 2022-05-26 | Stop reason: HOSPADM

## 2022-05-26 RX ORDER — LIDOCAINE 40 MG/G
CREAM TOPICAL
Status: DISCONTINUED | OUTPATIENT
Start: 2022-05-26 | End: 2022-05-26 | Stop reason: HOSPADM

## 2022-05-26 RX ORDER — FENTANYL CITRATE 50 UG/ML
25 INJECTION, SOLUTION INTRAMUSCULAR; INTRAVENOUS
Status: CANCELLED | OUTPATIENT
Start: 2022-05-26

## 2022-05-26 RX ORDER — ONDANSETRON 4 MG/1
4 TABLET, ORALLY DISINTEGRATING ORAL EVERY 8 HOURS PRN
Qty: 9 TABLET | Refills: 0 | Status: SHIPPED | OUTPATIENT
Start: 2022-05-26

## 2022-05-26 RX ORDER — AMOXICILLIN 250 MG
1 CAPSULE ORAL 2 TIMES DAILY
Status: DISCONTINUED | OUTPATIENT
Start: 2022-05-26 | End: 2022-05-27 | Stop reason: HOSPADM

## 2022-05-26 RX ORDER — PROPOFOL 10 MG/ML
INJECTION, EMULSION INTRAVENOUS CONTINUOUS PRN
Status: DISCONTINUED | OUTPATIENT
Start: 2022-05-26 | End: 2022-05-26

## 2022-05-26 RX ORDER — CEPHALEXIN 500 MG/1
500 CAPSULE ORAL 3 TIMES DAILY
Qty: 15 CAPSULE | Refills: 0 | Status: SHIPPED | OUTPATIENT
Start: 2022-05-26 | End: 2022-05-31

## 2022-05-26 RX ORDER — CEFAZOLIN SODIUM 1 G/3ML
1 INJECTION, POWDER, FOR SOLUTION INTRAMUSCULAR; INTRAVENOUS EVERY 8 HOURS
Status: COMPLETED | OUTPATIENT
Start: 2022-05-26 | End: 2022-05-27

## 2022-05-26 RX ORDER — OXYCODONE HYDROCHLORIDE 5 MG/1
5 TABLET ORAL EVERY 4 HOURS PRN
Status: DISCONTINUED | OUTPATIENT
Start: 2022-05-26 | End: 2022-05-26 | Stop reason: HOSPADM

## 2022-05-26 RX ORDER — LIDOCAINE 40 MG/G
CREAM TOPICAL
Status: DISCONTINUED | OUTPATIENT
Start: 2022-05-26 | End: 2022-05-27 | Stop reason: HOSPADM

## 2022-05-26 RX ORDER — FENTANYL CITRATE 50 UG/ML
INJECTION, SOLUTION INTRAMUSCULAR; INTRAVENOUS PRN
Status: DISCONTINUED | OUTPATIENT
Start: 2022-05-26 | End: 2022-05-26

## 2022-05-26 RX ORDER — ONDANSETRON 2 MG/ML
4 INJECTION INTRAMUSCULAR; INTRAVENOUS EVERY 30 MIN PRN
Status: DISCONTINUED | OUTPATIENT
Start: 2022-05-26 | End: 2022-05-26 | Stop reason: HOSPADM

## 2022-05-26 RX ORDER — ONDANSETRON 4 MG/1
4 TABLET, ORALLY DISINTEGRATING ORAL EVERY 6 HOURS PRN
Status: DISCONTINUED | OUTPATIENT
Start: 2022-05-26 | End: 2022-05-27 | Stop reason: HOSPADM

## 2022-05-26 RX ORDER — POLYETHYLENE GLYCOL 3350 17 G/17G
17 POWDER, FOR SOLUTION ORAL DAILY
Status: DISCONTINUED | OUTPATIENT
Start: 2022-05-27 | End: 2022-05-27 | Stop reason: HOSPADM

## 2022-05-26 RX ORDER — ROPINIROLE 0.5 MG/1
1 TABLET, FILM COATED ORAL 3 TIMES DAILY
COMMUNITY

## 2022-05-26 RX ORDER — HYDROCODONE BITARTRATE AND ACETAMINOPHEN 5; 325 MG/1; MG/1
1-2 TABLET ORAL EVERY 4 HOURS PRN
Qty: 30 TABLET | Refills: 0 | Status: SHIPPED | OUTPATIENT
Start: 2022-05-26 | End: 2022-06-29

## 2022-05-26 RX ORDER — ACETAMINOPHEN 325 MG/1
650 TABLET ORAL EVERY 4 HOURS PRN
Status: DISCONTINUED | OUTPATIENT
Start: 2022-05-29 | End: 2022-05-27 | Stop reason: HOSPADM

## 2022-05-26 RX ORDER — ONDANSETRON 4 MG/1
4 TABLET, ORALLY DISINTEGRATING ORAL EVERY 30 MIN PRN
Status: DISCONTINUED | OUTPATIENT
Start: 2022-05-26 | End: 2022-05-26 | Stop reason: HOSPADM

## 2022-05-26 RX ORDER — ACETAMINOPHEN 325 MG/1
975 TABLET ORAL EVERY 8 HOURS
Status: DISCONTINUED | OUTPATIENT
Start: 2022-05-26 | End: 2022-05-27 | Stop reason: HOSPADM

## 2022-05-26 RX ORDER — LIDOCAINE HYDROCHLORIDE 10 MG/ML
INJECTION, SOLUTION INFILTRATION; PERINEURAL PRN
Status: DISCONTINUED | OUTPATIENT
Start: 2022-05-26 | End: 2022-05-26

## 2022-05-26 RX ORDER — CEFAZOLIN SODIUM/WATER 2 G/20 ML
2 SYRINGE (ML) INTRAVENOUS
Status: COMPLETED | OUTPATIENT
Start: 2022-05-26 | End: 2022-05-26

## 2022-05-26 RX ORDER — HYDROMORPHONE HCL IN WATER/PF 6 MG/30 ML
0.2 PATIENT CONTROLLED ANALGESIA SYRINGE INTRAVENOUS
Status: DISCONTINUED | OUTPATIENT
Start: 2022-05-26 | End: 2022-05-27 | Stop reason: HOSPADM

## 2022-05-26 RX ORDER — TRANEXAMIC ACID 100 MG/ML
INJECTION, SOLUTION INTRAVENOUS
Status: COMPLETED
Start: 2022-05-26 | End: 2022-05-26

## 2022-05-26 RX ORDER — HYDROMORPHONE HCL IN WATER/PF 6 MG/30 ML
0.4 PATIENT CONTROLLED ANALGESIA SYRINGE INTRAVENOUS
Status: DISCONTINUED | OUTPATIENT
Start: 2022-05-26 | End: 2022-05-27 | Stop reason: HOSPADM

## 2022-05-26 RX ADMIN — FENTANYL CITRATE 50 MCG: 50 INJECTION, SOLUTION INTRAMUSCULAR; INTRAVENOUS at 13:40

## 2022-05-26 RX ADMIN — POTASSIUM CHLORIDE, DEXTROSE MONOHYDRATE AND SODIUM CHLORIDE: 150; 5; 450 INJECTION, SOLUTION INTRAVENOUS at 18:28

## 2022-05-26 RX ADMIN — DEXAMETHASONE SODIUM PHOSPHATE 10 MG: 10 INJECTION, SOLUTION INTRAMUSCULAR; INTRAVENOUS at 10:13

## 2022-05-26 RX ADMIN — Medication 2 G: at 10:18

## 2022-05-26 RX ADMIN — OXYCODONE HYDROCHLORIDE 5 MG: 5 TABLET ORAL at 15:42

## 2022-05-26 RX ADMIN — PROCHLORPERAZINE EDISYLATE 5 MG: 5 INJECTION INTRAMUSCULAR; INTRAVENOUS at 19:58

## 2022-05-26 RX ADMIN — ACETAMINOPHEN 975 MG: 325 TABLET ORAL at 18:53

## 2022-05-26 RX ADMIN — HYDROMORPHONE HYDROCHLORIDE 0.4 MG: 0.2 INJECTION, SOLUTION INTRAMUSCULAR; INTRAVENOUS; SUBCUTANEOUS at 18:28

## 2022-05-26 RX ADMIN — PHENYLEPHRINE HYDROCHLORIDE 100 MCG: 10 INJECTION INTRAVENOUS at 12:09

## 2022-05-26 RX ADMIN — PROPOFOL 30 MG: 10 INJECTION, EMULSION INTRAVENOUS at 10:44

## 2022-05-26 RX ADMIN — SUGAMMADEX 200 MG: 100 INJECTION, SOLUTION INTRAVENOUS at 13:42

## 2022-05-26 RX ADMIN — HYDROMORPHONE HYDROCHLORIDE 0.5 MG: 1 INJECTION, SOLUTION INTRAMUSCULAR; INTRAVENOUS; SUBCUTANEOUS at 10:35

## 2022-05-26 RX ADMIN — SODIUM CHLORIDE, POTASSIUM CHLORIDE, SODIUM LACTATE AND CALCIUM CHLORIDE 250 ML: 600; 310; 30; 20 INJECTION, SOLUTION INTRAVENOUS at 08:42

## 2022-05-26 RX ADMIN — ROCURONIUM BROMIDE 10 MG: 50 INJECTION, SOLUTION INTRAVENOUS at 12:21

## 2022-05-26 RX ADMIN — TRANEXAMIC ACID 1 G: 100 INJECTION, SOLUTION INTRAVENOUS at 11:09

## 2022-05-26 RX ADMIN — GABAPENTIN 300 MG: 300 CAPSULE ORAL at 22:14

## 2022-05-26 RX ADMIN — PHENYLEPHRINE HYDROCHLORIDE 100 MCG: 10 INJECTION INTRAVENOUS at 12:30

## 2022-05-26 RX ADMIN — SODIUM CHLORIDE, POTASSIUM CHLORIDE, SODIUM LACTATE AND CALCIUM CHLORIDE: 600; 310; 30; 20 INJECTION, SOLUTION INTRAVENOUS at 12:25

## 2022-05-26 RX ADMIN — FENTANYL CITRATE 25 MCG: 50 INJECTION INTRAMUSCULAR; INTRAVENOUS at 14:22

## 2022-05-26 RX ADMIN — LIDOCAINE HYDROCHLORIDE 5 ML: 10 INJECTION, SOLUTION INFILTRATION; PERINEURAL at 10:13

## 2022-05-26 RX ADMIN — PROPOFOL 30 MCG/KG/MIN: 10 INJECTION, EMULSION INTRAVENOUS at 10:30

## 2022-05-26 RX ADMIN — ROCURONIUM BROMIDE 40 MG: 50 INJECTION, SOLUTION INTRAVENOUS at 10:13

## 2022-05-26 RX ADMIN — ROCURONIUM BROMIDE 10 MG: 50 INJECTION, SOLUTION INTRAVENOUS at 11:02

## 2022-05-26 RX ADMIN — SENNOSIDES AND DOCUSATE SODIUM 1 TABLET: 8.6; 5 TABLET ORAL at 20:58

## 2022-05-26 RX ADMIN — ONDANSETRON 4 MG: 2 INJECTION INTRAMUSCULAR; INTRAVENOUS at 12:55

## 2022-05-26 RX ADMIN — SODIUM CHLORIDE, POTASSIUM CHLORIDE, SODIUM LACTATE AND CALCIUM CHLORIDE: 600; 310; 30; 20 INJECTION, SOLUTION INTRAVENOUS at 08:41

## 2022-05-26 RX ADMIN — PHENYLEPHRINE HYDROCHLORIDE 100 MCG: 10 INJECTION INTRAVENOUS at 12:45

## 2022-05-26 RX ADMIN — PHENYLEPHRINE HYDROCHLORIDE 100 MCG: 10 INJECTION INTRAVENOUS at 11:53

## 2022-05-26 RX ADMIN — PROPOFOL 160 MG: 10 INJECTION, EMULSION INTRAVENOUS at 10:13

## 2022-05-26 RX ADMIN — FENTANYL CITRATE 50 MCG: 50 INJECTION, SOLUTION INTRAMUSCULAR; INTRAVENOUS at 13:16

## 2022-05-26 RX ADMIN — ONDANSETRON 4 MG: 2 INJECTION INTRAMUSCULAR; INTRAVENOUS at 18:17

## 2022-05-26 RX ADMIN — CEFAZOLIN 1 G: 1 INJECTION, POWDER, FOR SOLUTION INTRAMUSCULAR; INTRAVENOUS at 18:57

## 2022-05-26 RX ADMIN — HYDROMORPHONE HYDROCHLORIDE 0.5 MG: 1 INJECTION, SOLUTION INTRAMUSCULAR; INTRAVENOUS; SUBCUTANEOUS at 15:55

## 2022-05-26 RX ADMIN — HYDROMORPHONE HYDROCHLORIDE 0.2 MG: 0.2 INJECTION, SOLUTION INTRAMUSCULAR; INTRAVENOUS; SUBCUTANEOUS at 22:14

## 2022-05-26 RX ADMIN — TRANEXAMIC ACID 1 G: 1 INJECTION, SOLUTION INTRAVENOUS at 12:54

## 2022-05-26 RX ADMIN — ROCURONIUM BROMIDE 10 MG: 50 INJECTION, SOLUTION INTRAVENOUS at 11:46

## 2022-05-26 RX ADMIN — MAGNESIUM SULFATE HEPTAHYDRATE 4 G: 4 INJECTION, SOLUTION INTRAVENOUS at 08:54

## 2022-05-26 RX ADMIN — PROPOFOL 40 MG: 10 INJECTION, EMULSION INTRAVENOUS at 13:28

## 2022-05-26 RX ADMIN — PROPOFOL 40 MG: 10 INJECTION, EMULSION INTRAVENOUS at 10:19

## 2022-05-26 RX ADMIN — HYDROMORPHONE HYDROCHLORIDE 0.5 MG: 1 INJECTION, SOLUTION INTRAMUSCULAR; INTRAVENOUS; SUBCUTANEOUS at 11:15

## 2022-05-26 RX ADMIN — FENTANYL CITRATE 100 MCG: 50 INJECTION, SOLUTION INTRAMUSCULAR; INTRAVENOUS at 10:13

## 2022-05-26 NOTE — PROGRESS NOTES
PRIMARY DIAGNOSIS: ACUTE PAIN  OUTPATIENT/OBSERVATION GOALS TO BE MET BEFORE DISCHARGE:  1. Pain Status: Improved but still requiring IV narcotics.    2. Return to near baseline physical activity: No    3. Cleared for discharge by consultants (if involved): No    Discharge Planner Nurse   Safe discharge environment identified: No  Barriers to discharge: Yes       Entered by: Elba Garcia RN 05/26/2022 5:53 PM     Please review provider order for any additional goals.   Nurse to notify provider when observation goals have been met and patient is ready for discharge.

## 2022-05-26 NOTE — ANESTHESIA PROCEDURE NOTES
Airway         Procedure Start/Stop Times: 5/26/2022 10:17 AM  Staff -        CRNA: Ivana Tarango APRN CRNA       Performed By: CRNA  Consent for Airway        Urgency: elective  Indications and Patient Condition       Indications for airway management: tyson-procedural       Induction type:intravenous       Mask difficulty assessment: 1 - vent by mask    Final Airway Details       Final airway type: endotracheal airway       Successful airway: ETT - single  Endotracheal Airway Details        ETT size (mm): 7.0       Cuffed: yes       Successful intubation technique: direct laryngoscopy       DL Blade Type: MAC 3       Grade View of Cords: 1       Adjucts: stylet and tooth guard       Position: Right       Measured from: lips       Secured at (cm): 22       Bite block used: None    Post intubation assessment        Placement verified by: capnometry, equal breath sounds and x-ray        Number of attempts at approach: 1       Number of other approaches attempted: 0       Secured with: silk tape       Ease of procedure: easy       Dentition: Intact and Unchanged    Medication(s) Administered   Medication Administration Time: 5/26/2022 10:17 AM

## 2022-05-26 NOTE — INTERVAL H&P NOTE
"I have reviewed the surgical (or preoperative) H&P that is linked to this encounter, and examined the patient. There are no significant changes    Clinical Conditions Present on Arrival:  Clinically Significant Risk Factors Present on Admission                   # Obesity: Estimated body mass index is 30.63 kg/m  as calculated from the following:    Height as of 5/20/22: 1.6 m (5' 3\").    Weight as of this encounter: 78.4 kg (172 lb 14.4 oz).     Regino Ramirez MD , FACS   Diplomate American Board of Plastic Surgery  Diplomate American Board of Surgery  Adj. Assistant Professor of Surgery  Division of Plastic & Reconstructive Surgery   [facility]  Office: [phone]   5/26/2022 at 9:26 AM    "

## 2022-05-26 NOTE — BRIEF OP NOTE
[facility]    Brief Operative Note    Pre-operative diagnosis: Silicone leakage from breast implant, initial encounter [T85.43XA]  Post-operative diagnosis Same as pre-operative diagnosis    Procedure: Procedure(s):  Explantation of Bilateral Breast Implant. Bilateral Capsulectomy. Intermediate closure 15 cm Right Mastectomy scar. Intermediate closure 16 cm Left Mastectomy scar.  Surgeon: Surgeon(s) and Role:     * Regino Ramirez MD - Primary  Anesthesia: General   Estimated Blood Loss: Less than 100 ml    Drains: Pratik-Lamb  Specimens:   ID Type Source Tests Collected by Time Destination   1 : IMPLANT -RIGHT BREAST Implant Breast, Right SURGICAL PATHOLOGY EXAM Regino Ramirez MD 5/26/2022 10:56 AM    2 : IMPLANT CAPSULE-RIGHT BREAST Capsule Breast, Right SURGICAL PATHOLOGY EXAM Regino Ramirez MD 5/26/2022 11:13 AM    3 : left breast capsule Tissue Breast, Left SURGICAL PATHOLOGY EXAM Regino Ramirez MD 5/26/2022 11:23 AM    4 : explant-right breast Implant Breast, Left SURGICAL PATHOLOGY EXAM Regino Ramirez MD 5/26/2022 11:24 AM      Findings:   Rupture left breast implant.  Complications: None.  Implants:   Implant Name Type Inv. Item Serial No.  Lot No. LRB No. Used Action   RIGHT BREAST IMPLANT       Right 1 Explanted     Regino Ramirez MD , FACS   Diplomate American Board of Plastic Surgery  Diplomate American Board of Surgery  Adj. Assistant Professor of Surgery  Division of Plastic & Reconstructive Surgery   [facility]  Office: [phone]   5/26/2022 at 1:58 PM

## 2022-05-26 NOTE — ANESTHESIA PREPROCEDURE EVALUATION
Anesthesia Pre-Procedure Evaluation    Patient: Danna Adams   MRN: [id number] : 1940        Procedure : Procedure(s):  Explantation of Bilateral Breast Implant. Bilateral Capsulectomy          Past Medical History:   Diagnosis Date     Anemia      Arthritis      Breast cancer (H)      Complication of anesthesia      Heart murmur, systolic      Restless leg syndrome       Past Surgical History:   Procedure Laterality Date     EYE SURGERY Left 2012     FOOT SURGERY Right      JOINT REPLACEMENT Right     hip     MASTECTOMY Bilateral      ZZC TOTAL KNEE ARTHROPLASTY Left 2016    Procedure: KNEE TOTAL ARTHROPLASTY, LEFT;  Surgeon: Joseluis Smiley MD;  Location: [facility];  Service: Orthopedics      No Known Allergies   Social History     Tobacco Use     Smoking status: Never Smoker     Smokeless tobacco: Never Used   Substance Use Topics     Alcohol use: Yes      Wt Readings from Last 1 Encounters:   22 78.4 kg (172 lb 14.4 oz)        Anesthesia Evaluation   Pt has had prior anesthetic.     No history of anesthetic complications       ROS/MED HX  ENT/Pulmonary:       Neurologic:       Cardiovascular:       METS/Exercise Tolerance:     Hematologic:       Musculoskeletal:       GI/Hepatic:       Renal/Genitourinary:       Endo:       Psychiatric/Substance Use:       Infectious Disease:       Malignancy:       Other:            Physical Exam    Airway        Mallampati: II    Neck ROM: full     Respiratory Devices and Support         Dental  no notable dental history         Cardiovascular   cardiovascular exam normal          Pulmonary   pulmonary exam normal                OUTSIDE LABS:  CBC: No results found for: WBC, HGB, HCT, PLT  BMP:   Lab Results   Component Value Date    CR 0.63 2016    CR 0.64 2016     COAGS: No results found for: PTT, INR, FIBR  POC: No results found for: BGM, HCG, HCGS  HEPATIC: No results found for: ALBUMIN, PROTTOTAL, ALT, AST, GGT, ALKPHOS,  BILITOTAL, BILIDIRECT, SHREE  OTHER: No results found for: PH, LACT, A1C, BRET, PHOS, MAG, LIPASE, AMYLASE, TSH, T4, T3, CRP, SED    Anesthesia Plan    ASA Status:  2      Anesthesia Type: General.     - Airway: ETT              Consents    Anesthesia Plan(s) and associated risks, benefits, and realistic alternatives discussed. Questions answered and patient/representative(s) expressed understanding.     - Discussed: Risks, Benefits and Alternatives for the PROCEDURE were discussed     - Discussed with:  Patient      - Extended Intubation/Ventilatory Support Discussed: No.    Use of blood products discussed: No .     Postoperative Care    Pain management: Multi-modal analgesia.        Comments:                Alida Dempsey MD

## 2022-05-26 NOTE — PHARMACY-ADMISSION MEDICATION HISTORY
Pharmacy Note - Admission Medication History    Pertinent Provider Information: cephalexin, Norco, Zofran and Senna-docusate are meds that are on the discharge plan and were not taken prior to admission. Epic put them in this note.  Lasix last taken more than 1 week ago.  Gabapentin last taken on 5/24/22  Ibuprofen taken more than 1 week ago.  Ropinirole: last taken at home on 5/25/22     ______________________________________________________________________    Prior To Admission (PTA) med list completed and updated in EMR.       PTA Med List   Medication Sig Note Last Dose     cephALEXin (KEFLEX) 500 MG capsule Take 1 capsule (500 mg) by mouth 3 times daily for 5 days       furosemide (LASIX) 20 MG tablet Take 1 tablet by mouth daily  Past Month at Unknown time     gabapentin (NEURONTIN) 300 MG capsule Take 300 mg by mouth 3 times daily Patient usually takes as needed  5/24/2022 at Unknown time     HYDROcodone-acetaminophen (NORCO) 5-325 MG tablet Take 1-2 tablets by mouth every 4 hours as needed for moderate to severe pain       ibuprofen (ADVIL/MOTRIN) 600 MG tablet ibuprofen 600 mg tablet   TAKE 1 TABLET BY MOUTH FOUR TIMES DAILY AS NEEDED FOR PAIN  Past Month at Unknown time     ondansetron (ZOFRAN ODT) 4 MG ODT tab Take 1 tablet (4 mg) by mouth every 8 hours as needed for nausea       rOPINIRole (REQUIP) 0.5 MG tablet Take 1 mg by mouth 3 times daily 5/26/2022: Patient said a dose was given 5/26/26 at hospital prior to surgery 5/25/2022 at pm     senna-docusate (SENOKOT-S/PERICOLACE) 8.6-50 MG tablet Take 1-2 tablets by mouth 2 times daily       TRAZodone (DESYREL) 50 MG tablet Take 50 mg by mouth At Bedtime.  5/25/2022 at pm       Information source(s): Patient and CareEverywhere/SureScripts  Method of interview communication: in-person    Summary of Changes to PTA Med List  New:   Discontinued: Fosamax, Sinemet  Changed: patient uses gabapentin PRN    Patient was asked about OTC/herbal products specifically.   PTA med list reflects this.    In the past week, patient estimated taking medication this percent of the time:  50-90% due to some meds not taken prior to surgery.    Allergies were reviewed, assessed, and updated with the patient.      Patient does not anticipate needing any multi-use medications during admission.    The information provided in this note is only as accurate as the sources available at the time of the update(s).    Thank you for the opportunity to participate in the care of this patient.    Fanta Meyer [facility]  5/26/2022 6:51 PM

## 2022-05-26 NOTE — OR NURSING
Patient 2 hours post op and not progressing with pain control or weaning from O2. Surgeon called to bedside to evaluate and plan changed to keep the patient overnight. Family updated with the plan and on board. Awaiting a room assignment at this time.

## 2022-05-26 NOTE — ANESTHESIA POSTPROCEDURE EVALUATION
Patient: Danna Adams    Procedure: Procedure(s):  Explantation of Bilateral Breast Implant. Bilateral Capsulectomy. Intermediate closure 15 cm Right Mastectomy scar. Intermediate closure 16 cm Left Mastectomy scar.       Anesthesia Type:  No value filed.    Note:  Disposition: Outpatient   Postop Pain Control: Uneventful            Sign Out: Well controlled pain   PONV: No   Neuro/Psych: Uneventful            Sign Out: Acceptable/Baseline neuro status   Airway/Respiratory: Uneventful            Sign Out: Acceptable/Baseline resp. status   CV/Hemodynamics: Uneventful            Sign Out: Acceptable CV status; No obvious hypovolemia; No obvious fluid overload   Other NRE: NONE   DID A NON-ROUTINE EVENT OCCUR? No           Last vitals:  Vitals Value Taken Time   /89 05/26/22 1450   Temp 36.1  C (96.9  F) 05/26/22 1354   Pulse 98 05/26/22 1452   Resp 13 05/26/22 1452   SpO2 93 % 05/26/22 1452   Vitals shown include unvalidated device data.    Electronically Signed By: Alida Dempsey MD  May 26, 2022  2:54 PM

## 2022-05-26 NOTE — PROGRESS NOTES
Mrs. Adams is a 81 yo lady s/p explantation of bilateral subpectoral implants, bilateral capsulectomies and intermediate closure of previous mastectomy scars.    Patient is still in recovery at the time that I'm writing this note. She is still somnolent and her pain is not well controlled.  Her VS are /63, HR 91, RR 18.     Dressing is clean and dry, no active bleeding. Brendan drains with sanguinous fluid. She denies shortness of breath.    My plan is: overnight stay for observation because she is somnolent and for pain control. Her son came to see us in PACU and he also wishes for the patient to stay overnight.    I expect discharge her tomorrow after breakfast.     Regino Ramirez MD , FACS   Diplomate American Board of Plastic Surgery  Diplomate American Board of Surgery  Adj. Assistant Professor of Surgery  Division of Plastic & Reconstructive Surgery   [facility]  Office: [phone]   5/26/2022 at 4:36 PM

## 2022-05-26 NOTE — INTERVAL H&P NOTE
"I have reviewed the surgical (or preoperative) H&P that is linked to this encounter, and examined the patient. There are no significant changes    Clinical Conditions Present on Arrival:  Clinically Significant Risk Factors Present on Admission                   # Obesity: Estimated body mass index is 30.63 kg/m  as calculated from the following:    Height as of 5/20/22: 1.6 m (5' 3\").    Weight as of this encounter: 78.4 kg (172 lb 14.4 oz).       Regino Ramirez MD , FACS   Diplomate American Board of Plastic Surgery  Diplomate American Board of Surgery  Adj. Assistant Professor of Surgery  Division of Plastic & Reconstructive Surgery   [facility]  Office: [phone]   5/26/2022 at 9:26 AM    "

## 2022-05-26 NOTE — ANESTHESIA CARE TRANSFER NOTE
Patient: Danna Adams    Procedure: Procedure(s):  Explantation of Bilateral Breast Implant. Bilateral Capsulectomy. Intermediate closure 15 cm Right Mastectomy scar. Intermediate closure 16 cm Left Mastectomy scar.       Diagnosis: Silicone leakage from breast implant, initial encounter [T85.43XA]  Diagnosis Additional Information: No value filed.    Anesthesia Type:   No value filed.     Note:    Oropharynx: oropharynx clear of all foreign objects and spontaneously breathing  Level of Consciousness: drowsy  Oxygen Supplementation: face mask  Level of Supplemental Oxygen (L/min / FiO2): 8  Independent Airway: airway patency satisfactory and stable  Dentition: dentition unchanged  Vital Signs Stable: post-procedure vital signs reviewed and stable  Report to RN Given: handoff report given  Patient transferred to: PACU    Handoff Report: Identifed the Patient, Identified the Reponsible Provider, Reviewed the pertinent medical history, Discussed the surgical course, Reviewed Intra-OP anesthesia mangement and issues during anesthesia, Set expectations for post-procedure period and Allowed opportunity for questions and acknowledgement of understanding      Vitals:  Vitals Value Taken Time   /77 05/26/22 1357   Temp 36.1  C (96.9  F) 05/26/22 1354   Pulse 105 05/26/22 1358   Resp 30 05/26/22 1358   SpO2 97 % 05/26/22 1358   Vitals shown include unvalidated device data.    Electronically Signed By: RAMESH Fallon CRNA  May 26, 2022  2:00 PM

## 2022-05-27 VITALS
BODY MASS INDEX: 30.63 KG/M2 | RESPIRATION RATE: 20 BRPM | TEMPERATURE: 98.6 F | HEART RATE: 77 BPM | DIASTOLIC BLOOD PRESSURE: 56 MMHG | WEIGHT: 172.9 LBS | SYSTOLIC BLOOD PRESSURE: 118 MMHG | OXYGEN SATURATION: 92 %

## 2022-05-27 PROCEDURE — 250N000013 HC RX MED GY IP 250 OP 250 PS 637: Performed by: PLASTIC SURGERY

## 2022-05-27 PROCEDURE — 250N000011 HC RX IP 250 OP 636: Performed by: PLASTIC SURGERY

## 2022-05-27 RX ADMIN — OXYCODONE HYDROCHLORIDE 5 MG: 5 TABLET ORAL at 02:18

## 2022-05-27 RX ADMIN — ACETAMINOPHEN 975 MG: 325 TABLET ORAL at 02:18

## 2022-05-27 RX ADMIN — CEFAZOLIN 1 G: 1 INJECTION, POWDER, FOR SOLUTION INTRAMUSCULAR; INTRAVENOUS at 02:31

## 2022-05-27 RX ADMIN — SENNOSIDES AND DOCUSATE SODIUM 1 TABLET: 8.6; 5 TABLET ORAL at 08:29

## 2022-05-27 RX ADMIN — ROPINIROLE HYDROCHLORIDE 1 MG: 1 TABLET, FILM COATED ORAL at 08:30

## 2022-05-27 RX ADMIN — OXYCODONE HYDROCHLORIDE 5 MG: 5 TABLET ORAL at 08:30

## 2022-05-27 RX ADMIN — ACETAMINOPHEN 975 MG: 325 TABLET ORAL at 10:07

## 2022-05-27 RX ADMIN — POLYETHYLENE GLYCOL 3350 17 G: 17 POWDER, FOR SOLUTION ORAL at 08:30

## 2022-05-27 NOTE — PROGRESS NOTES
Danna is a 82 years old lady status post bilateral breast implant explantation, bilateral capsulectomies and intermediate closure of bilateral mastectomy scars.  She stayed overnight secondary to pain control and feeling somnolent after surgery.    She is feeling much better today.  At the physical exam her dressing is clean and dry with no active hemorrhage.  Bilateral Brendan drains with minimal sanguinous fluid.  No events overnight.    Plan: Discharge home and follow-up with me next Thursday, June 2 at my plastic surgery clinic.    Regino Ramirez MD , FACS   Diplomate American Board of Plastic Surgery  Diplomate American Board of Surgery  Adj. Assistant Professor of Surgery  Division of Plastic & Reconstructive Surgery   [facility]  Office: [phone]   5/27/2022 at 12:32 PM

## 2022-05-27 NOTE — PROGRESS NOTES
PRIMARY DIAGNOSIS: ACUTE PAIN  OUTPATIENT/OBSERVATION GOALS TO BE MET BEFORE DISCHARGE:  1. Pain Status: Improved but still requiring IV narcotics.    2. Return to near baseline physical activity: No    3. Cleared for discharge by consultants (if involved): No    Discharge Planner Nurse   Safe discharge environment identified: No  Barriers to discharge: Yes Pt still very nauseous and requiring Iv pain meds.         Entered by: Elba Garcia RN 05/26/2022 8:19 PM   Pt got prn pain medication X2 with some effects. Pt also requested for gabapentin for neuropathic pain, which was given.  Please review provider order for any additional goals.   Nurse to notify provider when observation goals have been met and patient is ready for discharge.

## 2022-05-27 NOTE — PROGRESS NOTES
PRIMARY DIAGNOSIS: ACUTE PAIN  OUTPATIENT/OBSERVATION GOALS TO BE MET BEFORE DISCHARGE:  1. Pain Status: Improved-controlled with oral pain medications.    2. Return to near baseline physical activity: No    3. Cleared for discharge by consultants (if involved): No    Discharge Planner Nurse   Safe discharge environment identified: Yes  Barriers to discharge: No       Entered by: Manuela Champagne RN 05/27/2022 5:14 AM    Switched to oxycodone PO for pain.  It is well controlled. Denies nausea. Complaints of restless leg syndrome. Ropinirole due at 9 am.     Please review provider order for any additional goals.   Nurse to notify provider when observation goals have been met and patient is ready for discharge.

## 2022-05-27 NOTE — PLAN OF CARE
Goal Outcome Evaluation:    Plan of Care Reviewed With: patient     Overall Patient Progress: improving    Outcome Evaluation: pain is imroving with oral pain medications    Pt up with stand by assist and walker. Pt reports some dizziness but it is tolerable and thinks it is due to pain medication. Pt stated relief with oral pain medication. Pt was able to tolerate a piece of toast at breakfast. Pt had some concerns about nausea. Pt ate toast and reports not nausea after. Call light with in reach and waiting for md to come and discharge pt

## 2022-05-27 NOTE — PROGRESS NOTES
PRIMARY DIAGNOSIS: ACUTE PAIN  OUTPATIENT/OBSERVATION GOALS TO BE MET BEFORE DISCHARGE:  1. Pain Status: Improved-controlled with oral pain medications.    2. Return to near baseline physical activity: No    3. Cleared for discharge by consultants (if involved): No    Discharge Planner Nurse   Safe discharge environment identified: Yes  Barriers to discharge: No         Patient continent, ambulated to bathroom. GP draining small amounts of dark red fluid.     Please review provider order for any additional goals.   Nurse to notify provider when observation goals have been met and patient is ready for discharge.

## 2022-05-31 ENCOUNTER — OFFICE VISIT (OUTPATIENT)
Dept: PLASTIC SURGERY | Facility: AMBULATORY SURGERY CENTER | Age: 82
End: 2022-05-31
Payer: MEDICARE

## 2022-05-31 DIAGNOSIS — Z90.13 S/P BILATERAL MASTECTOMY: Primary | ICD-10-CM

## 2022-05-31 PROCEDURE — 99024 POSTOP FOLLOW-UP VISIT: CPT | Performed by: PLASTIC SURGERY

## 2022-05-31 NOTE — LETTER
5/31/2022         RE: Danna Adams  [address] [address]        Dear Colleague,    Thank you for referring your patient, Danna Adams, to the [facility]. Please see a copy of my visit note below.    Mrs. Adams is a 82 years old lady status post bilateral subpectoral implant explantation with bilateral subtotal capsulectomies.  She is postoperative day #5.    Patient returns to the office with allergic reaction to the Dermabond  Exofin along the scars.  The Dermabond strip has been removed.  Patient still with high output from each Brendan drain.                  Plan: Continue drain care, patient may take Benadryl pills and apply Benadryl ointment at least 3 times a day over the scars.  Return to see me this coming Friday.    Regino Ramirez MD , FACS   Diplomate American Board of Plastic Surgery  Diplomate American Board of Surgery  Adj. Assistant Professor of Surgery  Division of Plastic & Reconstructive Surgery   [facility]  Office: [phone]   5/31/2022 at 4:15 PM                 Again, thank you for allowing me to participate in the care of your patient.        Sincerely,        Regino Ramirez MD

## 2022-05-31 NOTE — PROGRESS NOTES
Mrs. Adams is a 82 years old lady status post bilateral subpectoral implant explantation with bilateral subtotal capsulectomies.  She is postoperative day #5.    Patient returns to the office with allergic reaction to the Dermabond  Exofin along the scars.  The Dermabond strip has been removed.  Patient still with high output from each Brendan drain.                  Plan: Continue drain care, patient may take Benadryl pills and apply Benadryl ointment at least 3 times a day over the scars.  Return to see me this coming Friday.    Regino Ramirez MD , FACS   Diplomate American Board of Plastic Surgery  Diplomate American Board of Surgery  Adj. Assistant Professor of Surgery  Division of Plastic & Reconstructive Surgery   [facility]  Office: [phone]   5/31/2022 at 4:15 PM

## 2022-05-31 NOTE — OP NOTE
May 30, 2022    Danna Adams      Preoperative diagnosis:  Bilateral ruptured subpectoral breast implants.  Previous history of bilateral breast reconstruction after bilateral mastectomies for right breast cancer.     Postoperative diagnosis: same     Procedure:    1) Bilateral subpectoral breast implant explantation    2) Bilateral partial capsulectomies    3) Right mastectomy intermediate closure 15 cm length    4) Left mastectomy intermediate closure 16 cm length      Surgeon: Regino Ramirez MD.     Assistant:  None.     Anesthesia:  General     IV fluids:  1300 mL    EBL: 50 mL     Findings:  Bilateral subpectoral ruptured implants.      Specimen:  Bilateral subpectoral implants, bilateral partial capsulectomies.     Drains:  A #19 Fr Brendan drain per breast.     Disposition:  Patient tolerated procedure well, she was extubated and transferred to recovery room awake in stable condition.     Indications:    Mrs. Adams is a 82 years old lady with prior history of right breast cancer status post bilateral mastectomies with implant-based reconstruction.  Patient have noticed progressive deflation of both implants.  Patient underwent ultrasound evaluation that showed that both implants were ruptured with free silicone around each implant.    Patient was referred to me and I have offered her bilateral subpectoral breast implant explantation, partial capsulectomy in order to prevent seroma formation and possible mastectomy scar revision in order to excise redundant skin after explantation.    Risks were explained to the patient and they include but are not limited to scarring, infection, dehiscence, bleeding, seroma, hematoma, need for further surgeries.  Patient has acknowledged all these risks and have signed informed consent agreeing to proceed.     Procedure:    Patient was identified in preoperative holding area and appropriate IV antibiotics were given to the patient.    Preoperative markings were  performed at the level of the mastectomy scars:        Then the patient was brought to the operating room and was placed supine on the operating room table.  After general anesthesia was obtained the chest was prepped and draped in sterile surgical fashion.    I first addressed the right breast, and proceeded to make incision along the old mastectomy scar with a scalpel #15.  I proceeded to perform capsulectomy encompassing the implant inside the capsule with a scalpel #15.  The plane of dissection was in the subpectoral plane until I reach the superior border of the capsule that was fusing with the underlying periosteum.  I continued dissection laterally and medially as well as inferiorly until once again I reach the aspect of the capsule that was fusing with the underlying periosteum.  At this time I divided the capsule and remove it along with the right breast implant that was deflated.  This was a partial capsulectomy due to the fact that I left on purpose, the posterior aspect of the capsule that was fused with the underlying periosteum of the ribs.  The capsule and the implant were passed off from the field as specimen and sent to pathology.  Copious irrigation with antibiotic solution was provided to this subpectoral pocket.  I found that hemostasis was excellent.    I then applied a #19 Afghan Brendan drain into this subpectoral pocket and the drain was secured to the skin with 2-0 Vicryl.  I then packed the cavity with moist laparotomy pads.    The same steps were done in the contralateral left breast.    I found that there was excessive, redundant skin on both breast mastectomy flaps.  Utilizing Allis clamps on the inferior edge of each breast upper mastectomy flap, I proceeded to ricky on the inferior mastectomy flap of each breast, the area to be trimmed and excised for closure.  The redundant skin on the inferior mastectomy flap was excised with electrocautery.  Once again I inspect for hemostasis and I  found that hemostasis was excellent.    Each mastectomy was closed in layers utilizing In-sorb absorbable staples in the subcutaneous tissue followed by 3-0 Stratafix running subcuticular stitches.  The length of closure on the right mastectomy incision was 15 cm and on the left mastectomy incision was 16 cm.    Exofin Prineo was applied along the mastectomy line, followed by Kerlix and Ace wrap.    Instrument count was reported by nursing personnel as correct.  Patient tolerated procedure well, she was then extubated and transferred to recovery room awake and in stable condition.    Regino Ramirez MD , FACS   Diplomate American Board of Plastic Surgery  Diplomate American Board of Surgery  Adj. Assistant Professor of Surgery  Division of Plastic & Reconstructive Surgery   [facility]  Office: [phone]   5/30/2022 at 9:38 PM

## 2022-06-01 LAB
PATH REPORT.COMMENTS IMP SPEC: NORMAL
PATH REPORT.FINAL DX SPEC: NORMAL
PATH REPORT.GROSS SPEC: NORMAL
PATH REPORT.MICROSCOPIC SPEC OTHER STN: NORMAL
PATH REPORT.RELEVANT HX SPEC: NORMAL
PHOTO IMAGE: NORMAL

## 2022-06-01 PROCEDURE — 88305 TISSUE EXAM BY PATHOLOGIST: CPT | Mod: 26 | Performed by: PATHOLOGY

## 2022-06-01 PROCEDURE — 88311 DECALCIFY TISSUE: CPT | Mod: 26 | Performed by: PATHOLOGY

## 2022-06-01 PROCEDURE — 88342 IMHCHEM/IMCYTCHM 1ST ANTB: CPT | Mod: 26 | Performed by: PATHOLOGY

## 2022-06-01 PROCEDURE — 88341 IMHCHEM/IMCYTCHM EA ADD ANTB: CPT | Mod: 26 | Performed by: PATHOLOGY

## 2022-06-01 PROCEDURE — 88300 SURGICAL PATH GROSS: CPT | Mod: 26 | Performed by: PATHOLOGY

## 2022-06-02 ENCOUNTER — NURSE TRIAGE (OUTPATIENT)
Dept: CARDIOLOGY | Facility: CLINIC | Age: 82
End: 2022-06-02
Payer: MEDICARE

## 2022-06-02 NOTE — TELEPHONE ENCOUNTER
"  Additional Information    Negative: Nursing judgment    Negative: Nursing judgment    Negative: Nursing judgment    Negative: Nursing judgment    Negative: Information only question and nurse able to answer    Answer Assessment - Initial Assessment Questions  1. REASON FOR CALL or QUESTION: \"What is your reason for calling today?\" or \"How can I best help you?\" or \"What question do you have that I can help answer?\"      Patient called because she had a procedure a few weeks ago to remove her breast implants and now she is experiencing some odd symptoms. She wanted to know who she should call. Patient has no cardiac history but her surgeon insisted she get cardiac clearance before the surgery. She said she felt fine when she woke up yesterday morning but when she went to get up it was like her feet weren't connected to her head and she couldn't walk. She was able to walk with her walker and later felt better. Last night she had the chills really bad. This morning she feels better again. I told her she should call her primary provider to be seen. She agreed to that plan and will call him now.    Protocols used: INFORMATION ONLY CALL - NO TRIAGE-A-OH    "

## 2022-06-03 ENCOUNTER — OFFICE VISIT (OUTPATIENT)
Dept: PLASTIC SURGERY | Facility: AMBULATORY SURGERY CENTER | Age: 82
End: 2022-06-03
Payer: MEDICARE

## 2022-06-03 DIAGNOSIS — Z98.86 STATUS POST IMPLANT REMOVAL FROM BOTH BREASTS: Primary | ICD-10-CM

## 2022-06-03 PROCEDURE — 99024 POSTOP FOLLOW-UP VISIT: CPT | Performed by: PLASTIC SURGERY

## 2022-06-03 NOTE — LETTER
6/3/2022         RE: Danna Adams  [address] [address]        Dear Colleague,    Thank you for referring your patient, Danna Adams, to the [facility]. Please see a copy of my visit note below.    Mrs. Clay is a 82 years old lady status post bilateral breast implant explantation with bilateral capsulectomies and intermediate closure of mastectomy wounds.  She is 8 days out from surgery.  Patient did develop an allergic reaction along the incisions secondary most likely to the Exofin/Dermabond.    At the physical exam, wounds are healing well, no evidence of dehiscence or infection.  The previous allergic erythema has greatly improved.  Bilateral Brendan drain is still with high output.    Plan: Continue with Brendan drains, continue compression garments, and return to see me in 1 week.    Regino Ramirez MD , FACS   Diplomate American Board of Plastic Surgery  Diplomate American Board of Surgery  Adj. Assistant Professor of Surgery  Division of Plastic & Reconstructive Surgery   [facility]  Office: [phone]   6/12/2022 at 12:58 PM         Again, thank you for allowing me to participate in the care of your patient.        Sincerely,        Regino Ramirez MD

## 2022-06-10 ENCOUNTER — OFFICE VISIT (OUTPATIENT)
Dept: PLASTIC SURGERY | Facility: AMBULATORY SURGERY CENTER | Age: 82
End: 2022-06-10
Payer: MEDICARE

## 2022-06-10 DIAGNOSIS — Z98.86 S/P BREAST IMPLANT REMOVAL: Primary | ICD-10-CM

## 2022-06-10 PROCEDURE — 99024 POSTOP FOLLOW-UP VISIT: CPT | Performed by: PLASTIC SURGERY

## 2022-06-10 RX ORDER — CIPROFLOXACIN 250 MG/1
TABLET, FILM COATED ORAL
COMMUNITY
Start: 2022-06-05 | End: 2022-06-29

## 2022-06-10 NOTE — LETTER
6/10/2022         RE: Danna Adams  [address] [address]        Dear Colleague,    Thank you for referring your patient, Danna Adams, to the [facility]GERY CLINIC [facility]. Please see a copy of my visit note below.    Ms. Clay is a 82 years lady status post bilateral breast implant explantation with bilateral capsulectomies.  She is 15 days out in surgery.    Patient is feeling better, allergic reaction has greatly improved along mastectomy scars.    Right side Brendan drain with output less than 30 cc a day and left side with still high output greater than 30 cc a day.  No evidence of hematoma or seroma or wound dehiscence.  No evidence of infection.  Right side Brendan drain was removed.    Plan: Continue with left side Brendan drain, continue compression dressing, and return to see me in 1 week for possible removal of the remaining left Brendan drain.    Regino Ramirez MD , FACS   Diplomate American Board of Plastic Surgery  Diplomate American Board of Surgery  Adj. Assistant Professor of Surgery  Division of Plastic & Reconstructive Surgery   [facility]  Office: [phone]   6/12/2022 at 11:03 PM             Again, thank you for allowing me to participate in the care of your patient.        Sincerely,        Regino Ramirez MD

## 2022-06-12 NOTE — PROGRESS NOTES
Mrs. Clay is a 82 years old lady status post bilateral breast implant explantation with bilateral capsulectomies and intermediate closure of mastectomy wounds.  She is 8 days out from surgery.  Patient did develop an allergic reaction along the incisions secondary most likely to the Exofin/Dermabond.    At the physical exam, wounds are healing well, no evidence of dehiscence or infection.  The previous allergic erythema has greatly improved.  Bilateral Brendan drain is still with high output.    Plan: Continue with Brendan drains, continue compression garments, and return to see me in 1 week.    Regino Ramirez MD , FACS   Diplomate American Board of Plastic Surgery  Diplomate American Board of Surgery  Adj. Assistant Professor of Surgery  Division of Plastic & Reconstructive Surgery   [facility]  Office: [phone]   6/12/2022 at 12:58 PM

## 2022-06-13 NOTE — PROGRESS NOTES
Ms. Clay is a 82 years lady status post bilateral breast implant explantation with bilateral capsulectomies.  She is 15 days out in surgery.    Patient is feeling better, allergic reaction has greatly improved along mastectomy scars.    Right side Brendan drain with output less than 30 cc a day and left side with still high output greater than 30 cc a day.  No evidence of hematoma or seroma or wound dehiscence.  No evidence of infection.  Right side Brendan drain was removed.    Plan: Continue with left side Brendan drain, continue compression dressing, and return to see me in 1 week for possible removal of the remaining left Brendan drain.    Regino Ramirez MD , FACS   Diplomate American Board of Plastic Surgery  Diplomate American Board of Surgery  Adj. Assistant Professor of Surgery  Division of Plastic & Reconstructive Surgery   [facility]  Office: [phone]   6/12/2022 at 11:03 PM

## 2022-06-17 ENCOUNTER — OFFICE VISIT (OUTPATIENT)
Dept: PLASTIC SURGERY | Facility: AMBULATORY SURGERY CENTER | Age: 82
End: 2022-06-17
Payer: MEDICARE

## 2022-06-17 VITALS — BODY MASS INDEX: 28.85 KG/M2 | HEIGHT: 64 IN | WEIGHT: 169 LBS

## 2022-06-17 DIAGNOSIS — Z90.13 S/P BILATERAL MASTECTOMY: Primary | ICD-10-CM

## 2022-06-17 PROCEDURE — 99024 POSTOP FOLLOW-UP VISIT: CPT | Performed by: PLASTIC SURGERY

## 2022-06-17 NOTE — LETTER
6/17/2022         RE: Danna Adams  [address] [facility] [address] [facility] [address]        Dear Colleague,    Thank you for referring your patient, Danna Adams, to the [facility]. Please see a copy of my visit note below.    Ms. Clay is a 82 years lady status post bilateral breast implant explantation with bilateral capsulectomies.  She is 3 weeks out from surgery.    Mastectomy scars are well-healed with no sign of dehiscence or hypertrophic scarring or keloids.  Allergic reaction has disappeared.  Remaining left Brendan drain has been removed due to output less than 30 cc in the last 48 hours.    Plan: Continue compression Ace wrap for the next 2 weeks.  I will see her in 2 weeks for reevaluation and then she can follow-up with me as needed.    Regino Ramirez MD , FACS   Diplomate American Board of Plastic Surgery  Diplomate American Board of Surgery  Adj. Assistant Professor of Surgery  Division of Plastic & Reconstructive Surgery   [facility]  Office: [phone]   6/19/2022 at 2:50 PM         Again, thank you for allowing me to participate in the care of your patient.        Sincerely,        Regino Ramirez MD

## 2022-06-19 NOTE — PROGRESS NOTES
Ms. Clay is a 82 years lady status post bilateral breast implant explantation with bilateral capsulectomies.  She is 3 weeks out from surgery.    Mastectomy scars are well-healed with no sign of dehiscence or hypertrophic scarring or keloids.  Allergic reaction has disappeared.  Remaining left Brendan drain has been removed due to output less than 30 cc in the last 48 hours.    Plan: Continue compression Ace wrap for the next 2 weeks.  I will see her in 2 weeks for reevaluation and then she can follow-up with me as needed.    Regino Ramirez MD , FACS   Diplomate American Board of Plastic Surgery  Diplomate American Board of Surgery  Adj. Assistant Professor of Surgery  Division of Plastic & Reconstructive Surgery   [facility]  Office: [phone]   6/19/2022 at 2:50 PM

## 2022-06-29 ENCOUNTER — OFFICE VISIT (OUTPATIENT)
Dept: PLASTIC SURGERY | Facility: AMBULATORY SURGERY CENTER | Age: 82
End: 2022-06-29
Payer: MEDICARE

## 2022-06-29 DIAGNOSIS — Z98.890 HISTORY OF RECONSTRUCTION OF BOTH BREASTS: Primary | ICD-10-CM

## 2022-06-29 PROCEDURE — 99024 POSTOP FOLLOW-UP VISIT: CPT | Performed by: PLASTIC SURGERY

## 2022-06-29 NOTE — LETTER
6/29/2022         RE: Danna Adams  [address] [facility] [address]        Dear Colleague,    Thank you for referring your patient, Danna Adams, to the [facility]. Please see a copy of my visit note below.    Mrs. Clay is a 82 years old lady status post bilateral implant explantation with bilateral capsulectomies.  She is 1 month out from surgery.  Patient is feeling excellent.    At the physical exam, both mastectomy scars are well-healed. There is no evidence of late seroma or hematoma.    Plan: Continue to apply moisturizing cream along the mastectomy scars and follow-up with me as needed.  Patient has done extremely well.    Regino Ramirez MD , FACS   Diplomate American Board of Plastic Surgery  Diplomate American Board of Surgery  Adj. Assistant Professor of Surgery  Division of Plastic & Reconstructive Surgery   [facility]  Office: [phone]   6/29/2022 at 9:56 PM             Again, thank you for allowing me to participate in the care of your patient.        Sincerely,        Regino Ramirez MD

## 2022-06-30 NOTE — PROGRESS NOTES
Mrs. Clay is a 82 years old lady status post bilateral implant explantation with bilateral capsulectomies.  She is 1 month out from surgery.  Patient is feeling excellent.    At the physical exam, both mastectomy scars are well-healed. There is no evidence of late seroma or hematoma.    Plan: Continue to apply moisturizing cream along the mastectomy scars and follow-up with me as needed.  Patient has done extremely well.    Regino Ramirez MD , FACS   Diplomate American Board of Plastic Surgery  Diplomate American Board of Surgery  Adj. Assistant Professor of Surgery  Division of Plastic & Reconstructive Surgery   [facility]  Office: [phone]   6/29/2022 at 9:56 PM          Mohs Method Verbiage: An incision at a 45 degree angle following the standard Mohs approach was done and the specimen was harvested as a microscopic controlled layer.

## 2022-08-06 ENCOUNTER — APPOINTMENT (OUTPATIENT)
Dept: CT IMAGING | Facility: HOSPITAL | Age: 82
End: 2022-08-06
Attending: EMERGENCY MEDICINE
Payer: MEDICARE

## 2022-08-06 ENCOUNTER — APPOINTMENT (OUTPATIENT)
Dept: CT IMAGING | Facility: HOSPITAL | Age: 82
End: 2022-08-06
Payer: MEDICARE

## 2022-08-06 ENCOUNTER — APPOINTMENT (OUTPATIENT)
Dept: RADIOLOGY | Facility: HOSPITAL | Age: 82
End: 2022-08-06
Attending: EMERGENCY MEDICINE
Payer: MEDICARE

## 2022-08-06 ENCOUNTER — HOSPITAL ENCOUNTER (EMERGENCY)
Facility: HOSPITAL | Age: 82
Discharge: HOME | End: 2022-08-06
Admitting: PHYSICIAN ASSISTANT
Payer: MEDICARE

## 2022-08-06 VITALS
DIASTOLIC BLOOD PRESSURE: 58 MMHG | OXYGEN SATURATION: 97 % | HEIGHT: 64 IN | HEART RATE: 71 BPM | BODY MASS INDEX: 28.85 KG/M2 | RESPIRATION RATE: 16 BRPM | TEMPERATURE: 98.2 F | SYSTOLIC BLOOD PRESSURE: 116 MMHG | WEIGHT: 169 LBS

## 2022-08-06 DIAGNOSIS — S20.211A RIB CONTUSION, RIGHT, INITIAL ENCOUNTER: ICD-10-CM
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
## 2022-08-06 DIAGNOSIS — S50.01XA TRAUMATIC ECCHYMOSIS OF RIGHT ELBOW, INITIAL ENCOUNTER: ICD-10-CM

## 2022-08-06 PROCEDURE — 99285 EMERGENCY DEPT VISIT HI MDM: CPT | Mod: 25

## 2022-08-06 PROCEDURE — G1010 CDSM STANSON: HCPCS

## 2022-08-06 PROCEDURE — 250N000013 HC RX MED GY IP 250 OP 250 PS 637: Performed by: EMERGENCY MEDICINE

## 2022-08-06 PROCEDURE — 73080 X-RAY EXAM OF ELBOW: CPT | Mod: RT

## 2022-08-06 PROCEDURE — 71250 CT THORAX DX C-: CPT | Mod: MG

## 2022-08-06 RX ORDER — HYDROCODONE BITARTRATE AND ACETAMINOPHEN 5; 325 MG/1; MG/1
1 TABLET ORAL ONCE
Status: COMPLETED | OUTPATIENT
Start: 2022-08-06 | End: 2022-08-06

## 2022-08-06 RX ADMIN — HYDROCODONE BITARTRATE AND ACETAMINOPHEN 1 TABLET: 5; 325 TABLET ORAL at 11:34

## 2022-08-06 ASSESSMENT — ENCOUNTER SYMPTOMS
DIZZINESS: 0
WEAKNESS: 0
NECK PAIN: 0
BACK PAIN: 0
NAUSEA: 0
ABDOMINAL PAIN: 0
VOMITING: 0
HEADACHES: 0
SHORTNESS OF BREATH: 0

## 2022-08-06 NOTE — DISCHARGE INSTRUCTIONS
As we discussed, your head, chest, and elbow imaging showed no concerning findings.  For your bruised elbow, please elevate it at home when able, ice the elbow for 20 minutes every 2-3 hours, use Tylenol or Motrin as needed for pain.  You likely have bruised ribs on your right side.  These can be quite painful for several weeks to a month.  Please make sure you are taking deep breaths throughout the day to avoid steak and air in your lungs that could lead to a pneumonia.  We will send you home with an incentive spirometer and advised that you use that 3 times per day to help take deep breaths.  If at anytime you develop severe headache, vomiting, visual or speech changes, arm or leg weakness or numbness, worsening chest pain or difficulty breathing, neck or back pain, or any new or concerning symptoms please return to the ER for further evaluation.

## 2022-08-06 NOTE — ED TRIAGE NOTES
Pt was in a camper and got up to go the BR when her right leg gave out and she fell, hitting the back of her head, right elbow, right ribs. All those areas hurt at 10/10. No thinners.  Right elbow is bruised and right ribs hurt when she inspires.

## 2022-08-06 NOTE — ED PROVIDER NOTES
Emergency Department Encounter   NAME: Danna Adams ; AGE: 82 year old female ; YOB: 1940 ; MRN: [id number] ; PCP: David Dennis   ED PROVIDER: Yane West PA-C    Evaluation Date & Time:   No admission date for patient encounter.    CHIEF COMPLAINT:  Fall      Impression and Plan   MDM: Danna Adams is a 82 year old female with a pertinent history of systolic heart murmur, hyperlipidemia, binge eating disorder, osteoarthritis, peripheral neuropathy, breast cancer, who presents to the ED by private vehicle with son for evaluation following a fall.  The patient unfortunately was having some pain in her right knee and leg with an underlying history of osteoarthritis and peripheral neuropathy over the past several days while camping.  She attempted to step out of the bed this morning and the van, when her leg gave out and she fell to the floor.  She landed on her right side, injuring her right elbow, right sided rib cage, and hit her head.  She is not anticoagulated, denies LOC, headache, neck pain, or any neurologic complaints.  Here in the ED, she is a very spry 82-year-old female, and is ambulating without any assistance.  She does have a palpable contusion to her posterior occipital scalp.  Thoroughly palpated her cervical spine without any reproducible tenderness, step-offs, or any pain.  She has full active motion of the neck and is neurovascularly intact.  No concern at this time for traumatic cervical spine injury.  CT of her head was obtained and was negative for traumatic intracranial hemorrhage and skull fracture.  She is neurologically intact which is reassuring.  She does have extensive bruising overlying her right elbow and distal humerus.  X-ray obtained and shows no underlying fracture, subluxation or dislocation.  No joint effusion to suggest hairline fracture.  We discussed supportive measures for her elbow at home.  Offered sling, however she declined.  CT of her  chest was obtained due to lateral right chest tenderness though no evidence of underlying rib fractures, pneumothorax or hemothorax visualized.  She did not have any tenderness in her abdomen, flank, or back to suggest need for further imaging.  At this time, patient feels reassured, the remainder of her exam is unremarkable, and her pain is well controlled after 1 dose of hydrocodone.  Offered her further pain prescription for home, however she has leftover tablets from her recent breast surgery that can she can use as needed.  We reviewed concerning signs and symptoms to return to the emergency department and she verbalized understanding.  Will discharge the patient with incentive spirometer and counseled on deep breathing throughout the day due to her bruised ribs.  Patient is comfortable with the plan was discharged home in good condition.    *Patient examination and workup was initiated in triage due to local hospital bed shortage. Patient and/or guardian's consent was obtained.     ED COURSE:  11:20 AM I met and introduced myself to the patient. I gathered initial history and performed my physical exam. We discussed plan for initial workup.   12:51 PM rechecked the patient and discussed results, discharge, follow-up, and reasons to return to the emergency department.      At the conclusion of the encounter I discussed the results of all the tests and the disposition. The questions were answered. The patient or family acknowledged understanding and was agreeable with the care plan.    FINAL IMPRESSION:    ICD-10-CM    1. Fall, initial encounter  W19.XXXA    2. Traumatic ecchymosis of right elbow, initial encounter  S50.01XA    3. Rib contusion, right, initial encounter  S20.211A          MEDICATIONS GIVEN IN THE EMERGENCY DEPARTMENT:  Medications   HYDROcodone-acetaminophen (NORCO) 5-325 MG per tablet 1 tablet (1 tablet Oral Given 8/6/22 1134)         NEW PRESCRIPTIONS STARTED AT TODAY'S ED VISIT:  Discharge  Medication List as of 8/6/2022  1:07 PM            HPI   Patient information was obtained from: Patient    Use of Intrepreter: N/A    Danna Adams is a 82 year old female with a pertinent history of systolic heart murmur, hyperlipidemia, binge eating disorder, osteoarthritis, peripheral neuropathy, breast cancer, who presents to the ED by private vehicle with son for evaluation following a fall.     Per patient, she has been camping over the past several days.  She has a history of arthritis as well as peripheral neuropathy, and she was having some pain in her right knee and leg.  She woke up around 4 AM this morning to use the bathroom.  She was in a van and when she swung her leg over to get out of the bed, her right leg gave out, she fell to the floor.  She landed with the majority of weight on her right elbow and right side, and currently has right elbow pain and bruising as well as pain along her rib cage with worsening pain with inspiration.  She secondarily hit her posterior head, though denies LOC, headache, nausea, vomiting, visual or speech changes, or confusion.  She has been walking since the fall without assistance.  She is not anticoagulated.  She denies any pain in her neck, back, and pelvis.  She has not taken anything yet for the pain.    REVIEW OF SYSTEMS:  Review of Systems   Eyes: Negative for visual disturbance.   Respiratory: Negative for shortness of breath.    Cardiovascular: Positive for chest pain (right ribcage pain).   Gastrointestinal: Negative for abdominal pain, nausea and vomiting.   Musculoskeletal: Negative for back pain and neck pain.        Positive for right elbow pain.   Neurological: Negative for dizziness, syncope, weakness and headaches.   All other systems reviewed and are negative.        Medical History     Past Medical History:   Diagnosis Date     Anemia      Arthritis      Breast cancer (H)      Complication of anesthesia      Heart murmur, systolic      Restless  "leg syndrome        Past Surgical History:   Procedure Laterality Date     EYE SURGERY Left 2012     FOOT SURGERY Right      JOINT REPLACEMENT Right     hip     MASTECTOMY Bilateral      REMOVE IMPLANT BREAST Bilateral 5/26/2022    Procedure: Explantation of Bilateral Breast Implant. Bilateral Capsulectomy. Intermediate closure 15 cm Right Mastectomy scar. Intermediate closure 16 cm Left Mastectomy scar.;  Surgeon: Regino Ramirez MD;  Location: [facility] TOTAL KNEE ARTHROPLASTY Left 04/12/2016    Procedure: KNEE TOTAL ARTHROPLASTY, LEFT;  Surgeon: Joseluis Smiley MD;  Location: [facility];  Service: Orthopedics       No family history on file.    Social History     Tobacco Use     Smoking status: Never Smoker     Smokeless tobacco: Never Used   Substance Use Topics     Alcohol use: Yes     Drug use: Never       furosemide (LASIX) 20 MG tablet  gabapentin (NEURONTIN) 300 MG capsule  ibuprofen (ADVIL/MOTRIN) 600 MG tablet  ondansetron (ZOFRAN ODT) 4 MG ODT tab  rOPINIRole (REQUIP) 0.5 MG tablet  senna-docusate (SENOKOT-S/PERICOLACE) 8.6-50 MG tablet  TRAZodone (DESYREL) 50 MG tablet          Physical Exam     First Vitals:  Patient Vitals for the past 24 hrs:   BP Temp Temp src Pulse Resp SpO2 Height Weight   08/06/22 1302 -- -- -- 71 -- -- -- --   08/06/22 1300 116/58 98.2  F (36.8  C) Oral -- 16 97 % -- --   08/06/22 1104 137/70 (!) 96.4  F (35.8  C) Tympanic 77 12 97 % 1.626 m (5' 4\") 76.7 kg (169 lb)         PHYSICAL EXAM:   Physical Exam  Vitals and nursing note reviewed.   Constitutional:       General: She is not in acute distress.     Appearance: Normal appearance. She is not ill-appearing or toxic-appearing.   HENT:      Head: Normocephalic.      Comments: Palpable contusion to the mid posterior septal scalp.  No surrounding bogginess or step-offs.  No wound or laceration.     Right Ear: Tympanic membrane normal.      Left Ear: Tympanic membrane normal.      Mouth/Throat:      Mouth: " Mucous membranes are moist.      Pharynx: Oropharynx is clear.   Eyes:      Extraocular Movements: Extraocular movements intact.      Conjunctiva/sclera: Conjunctivae normal.      Pupils: Pupils are equal, round, and reactive to light.   Neck:      Comments: No midline cervical spinal tenderness or palpable bony step-offs.  Full active range of motion of the neck without difficulty or pain.  Cardiovascular:      Rate and Rhythm: Normal rate and regular rhythm.      Pulses: Normal pulses.      Heart sounds: Murmur heard.   Pulmonary:      Effort: Pulmonary effort is normal.      Breath sounds: Normal breath sounds. No wheezing, rhonchi or rales.   Chest:      Chest wall: Tenderness (lateral right chest wall; no crepitus, bruising, or step offs) present.   Abdominal:      General: Abdomen is flat. There is no distension.      Palpations: Abdomen is soft.      Tenderness: There is no abdominal tenderness. There is no right CVA tenderness, left CVA tenderness, guarding or rebound.   Musculoskeletal:      Cervical back: Normal range of motion and neck supple.      Comments: Extensive bruising and tenderness to the left posterior distal upper arm and elbow.  She is able to actively fully flex and extend at the elbow joint.  2+ radial pulses and distal cap refill less than 2 seconds.  5 out of 5 strength with , radial, medial, and ulnar nerves intact.  Pelvis is stable.  No reproducible tenderness to her right leg or knee.  2+ DP and PT pulses.  Normal gait.   Skin:     General: Skin is warm and dry.      Capillary Refill: Capillary refill takes less than 2 seconds.   Neurological:      Mental Status: She is alert and oriented to person, place, and time. Mental status is at baseline.      GCS: GCS eye subscore is 4. GCS verbal subscore is 5. GCS motor subscore is 6.      Sensory: Sensation is intact.      Motor: No pronator drift.      Gait: Gait is intact.      Comments: Cranial nerves III through XII intact.              Results     LAB:  All pertinent labs reviewed and interpreted  Labs Ordered and Resulted from Time of ED Arrival to Time of ED Departure - No data to display    RADIOLOGY:  Elbow XR, G/E 3 views, right   Final Result   IMPRESSION: Normal joint spaces and alignment. No fracture or joint effusion.         Chest CT w/o contrast   Final Result   IMPRESSION:    1.  No intrathoracic traumatic pathology identified.         Head CT w/o contrast   Final Result   IMPRESSION:   1.  No CT evidence for acute intracranial process.   2.  Brain atrophy and presumed chronic microvascular ischemic changes as above.               Yane West PA-C   Emergency Medicine   [facility] EMERGENCY DEPARTMENT       Yane West PA-C  08/06/22 3442

## 2022-08-08 ENCOUNTER — NURSE TRIAGE (OUTPATIENT)
Dept: NURSING | Facility: CLINIC | Age: 82
End: 2022-08-08

## 2022-08-08 ENCOUNTER — HOSPITAL ENCOUNTER (EMERGENCY)
Facility: HOSPITAL | Age: 82
Discharge: HOME | End: 2022-08-08
Attending: EMERGENCY MEDICINE | Admitting: EMERGENCY MEDICINE
Payer: MEDICARE

## 2022-08-08 ENCOUNTER — APPOINTMENT (OUTPATIENT)
Dept: CT IMAGING | Facility: HOSPITAL | Age: 82
End: 2022-08-08
Attending: PHYSICIAN ASSISTANT
Payer: MEDICARE

## 2022-08-08 VITALS
OXYGEN SATURATION: 96 % | HEIGHT: 64 IN | BODY MASS INDEX: 28.17 KG/M2 | RESPIRATION RATE: 16 BRPM | TEMPERATURE: 97.6 F | HEART RATE: 76 BPM | SYSTOLIC BLOOD PRESSURE: 166 MMHG | WEIGHT: 165 LBS | DIASTOLIC BLOOD PRESSURE: 80 MMHG

## 2022-08-08 DIAGNOSIS — R07.89 CHEST WALL PAIN: ICD-10-CM

## 2022-08-08 DIAGNOSIS — R07.89 MUSCULOSKELETAL CHEST PAIN: ICD-10-CM

## 2022-08-08 LAB
ALBUMIN SERPL-MCNC: 3.5 G/DL (ref 3.5–5)
ALBUMIN UR-MCNC: NEGATIVE MG/DL
ALP SERPL-CCNC: 75 U/L (ref 45–120)
ALT SERPL W P-5'-P-CCNC: 17 U/L (ref 0–45)
ANION GAP SERPL CALCULATED.3IONS-SCNC: 8 MMOL/L (ref 5–18)
APPEARANCE UR: CLEAR
AST SERPL W P-5'-P-CCNC: 18 U/L (ref 0–40)
BASOPHILS # BLD AUTO: 0 10E3/UL (ref 0–0.2)
BASOPHILS NFR BLD AUTO: 0 %
BILIRUB SERPL-MCNC: 0.4 MG/DL (ref 0–1)
BILIRUB UR QL STRIP: NEGATIVE
BUN SERPL-MCNC: 15 MG/DL (ref 8–28)
CALCIUM SERPL-MCNC: 9.4 MG/DL (ref 8.5–10.5)
CHLORIDE BLD-SCNC: 101 MMOL/L (ref 98–107)
CO2 SERPL-SCNC: 27 MMOL/L (ref 22–31)
COLOR UR AUTO: COLORLESS
CREAT SERPL-MCNC: 0.73 MG/DL (ref 0.6–1.1)
EOSINOPHIL # BLD AUTO: 0.2 10E3/UL (ref 0–0.7)
EOSINOPHIL NFR BLD AUTO: 2 %
ERYTHROCYTE [DISTWIDTH] IN BLOOD BY AUTOMATED COUNT: 13.2 % (ref 10–15)
GFR SERPL CREATININE-BSD FRML MDRD: 81 ML/MIN/1.73M2
GLUCOSE BLD-MCNC: 94 MG/DL (ref 70–125)
GLUCOSE UR STRIP-MCNC: NEGATIVE MG/DL
HCT VFR BLD AUTO: 36.1 % (ref 35–47)
HGB BLD-MCNC: 11.7 G/DL (ref 11.7–15.7)
HGB UR QL STRIP: NEGATIVE
IMM GRANULOCYTES # BLD: 0 10E3/UL
IMM GRANULOCYTES NFR BLD: 1 %
KETONES UR STRIP-MCNC: NEGATIVE MG/DL
LEUKOCYTE ESTERASE UR QL STRIP: NEGATIVE
LIPASE SERPL-CCNC: 16 U/L (ref 0–52)
LYMPHOCYTES # BLD AUTO: 1.8 10E3/UL (ref 0.8–5.3)
LYMPHOCYTES NFR BLD AUTO: 21 %
MCH RBC QN AUTO: 28.7 PG (ref 26.5–33)
MCHC RBC AUTO-ENTMCNC: 32.4 G/DL (ref 31.5–36.5)
MCV RBC AUTO: 89 FL (ref 78–100)
MONOCYTES # BLD AUTO: 0.5 10E3/UL (ref 0–1.3)
MONOCYTES NFR BLD AUTO: 6 %
NEUTROPHILS # BLD AUTO: 6.2 10E3/UL (ref 1.6–8.3)
NEUTROPHILS NFR BLD AUTO: 70 %
NITRATE UR QL: NEGATIVE
NRBC # BLD AUTO: 0 10E3/UL
NRBC BLD AUTO-RTO: 0 /100
PH UR STRIP: 6.5 [PH] (ref 5–7)
PLATELET # BLD AUTO: 361 10E3/UL (ref 150–450)
POTASSIUM BLD-SCNC: 4.4 MMOL/L (ref 3.5–5)
PROT SERPL-MCNC: 6.8 G/DL (ref 6–8)
RBC # BLD AUTO: 4.07 10E6/UL (ref 3.8–5.2)
RBC URINE: 1 /HPF
SODIUM SERPL-SCNC: 136 MMOL/L (ref 136–145)
SP GR UR STRIP: 1.01 (ref 1–1.03)
UROBILINOGEN UR STRIP-MCNC: <2 MG/DL
WBC # BLD AUTO: 8.7 10E3/UL (ref 4–11)
WBC URINE: 1 /HPF

## 2022-08-08 PROCEDURE — 71250 CT THORAX DX C-: CPT | Mod: MG

## 2022-08-08 PROCEDURE — 82040 ASSAY OF SERUM ALBUMIN: CPT | Performed by: PHYSICIAN ASSISTANT

## 2022-08-08 PROCEDURE — 81001 URINALYSIS AUTO W/SCOPE: CPT | Performed by: PHYSICIAN ASSISTANT

## 2022-08-08 PROCEDURE — 250N000013 HC RX MED GY IP 250 OP 250 PS 637: Performed by: PHYSICIAN ASSISTANT

## 2022-08-08 PROCEDURE — 99284 EMERGENCY DEPT VISIT MOD MDM: CPT | Mod: 25

## 2022-08-08 PROCEDURE — 83690 ASSAY OF LIPASE: CPT | Performed by: PHYSICIAN ASSISTANT

## 2022-08-08 PROCEDURE — 85014 HEMATOCRIT: CPT | Performed by: PHYSICIAN ASSISTANT

## 2022-08-08 PROCEDURE — 96372 THER/PROPH/DIAG INJ SC/IM: CPT | Performed by: PHYSICIAN ASSISTANT

## 2022-08-08 PROCEDURE — 250N000011 HC RX IP 250 OP 636: Performed by: PHYSICIAN ASSISTANT

## 2022-08-08 PROCEDURE — G1010 CDSM STANSON: HCPCS

## 2022-08-08 PROCEDURE — 80053 COMPREHEN METABOLIC PANEL: CPT | Performed by: PHYSICIAN ASSISTANT

## 2022-08-08 PROCEDURE — 36415 COLL VENOUS BLD VENIPUNCTURE: CPT | Performed by: PHYSICIAN ASSISTANT

## 2022-08-08 RX ORDER — CYCLOBENZAPRINE HCL 10 MG
10 TABLET ORAL 3 TIMES DAILY PRN
Qty: 12 TABLET | Refills: 0 | Status: SHIPPED | OUTPATIENT
Start: 2022-08-08 | End: 2022-08-12

## 2022-08-08 RX ORDER — CYCLOBENZAPRINE HCL 10 MG
10 TABLET ORAL ONCE
Status: COMPLETED | OUTPATIENT
Start: 2022-08-08 | End: 2022-08-08

## 2022-08-08 RX ORDER — KETOROLAC TROMETHAMINE 15 MG/ML
15 INJECTION, SOLUTION INTRAMUSCULAR; INTRAVENOUS ONCE
Status: COMPLETED | OUTPATIENT
Start: 2022-08-08 | End: 2022-08-08

## 2022-08-08 RX ORDER — LIDOCAINE 4 G/G
1 PATCH TOPICAL ONCE
Status: DISCONTINUED | OUTPATIENT
Start: 2022-08-08 | End: 2022-08-08 | Stop reason: HOSPADM

## 2022-08-08 RX ADMIN — LIDOCAINE 1 PATCH: 246 PATCH TOPICAL at 09:06

## 2022-08-08 RX ADMIN — CYCLOBENZAPRINE 10 MG: 10 TABLET, FILM COATED ORAL at 09:06

## 2022-08-08 RX ADMIN — KETOROLAC TROMETHAMINE 15 MG: 15 INJECTION, SOLUTION INTRAMUSCULAR; INTRAVENOUS at 09:06

## 2022-08-08 ASSESSMENT — ENCOUNTER SYMPTOMS
BACK PAIN: 1
FREQUENCY: 0
DYSURIA: 0
ABDOMINAL PAIN: 0

## 2022-08-08 NOTE — TELEPHONE ENCOUNTER
"\"I fell on Saturday and had a CT scan of my ribs and chest.\" Danna states her pain is worsened since she was seen. Patient does have Hydrocodone that she took last night, but not this morning. FNA RN advised to take some pain medications and to call back if no improvement within a couple of hours. RN unable to conduct a full triage as patient sounded like she was in a hurry and proceeded to disconnect the call just as soon as pain meds were advised. Otherwise, Danna is awake, alert, and responding appropriately. RN advised to call back with any changes, worsening of symptoms, and questions or concerns. Danna verbalized understanding of and agreement with plan and had no further questions.     Reason for Disposition    [1] Recent medical visit within 24 hours AND [2] condition / symptoms WORSE    Protocols used: RECENT MEDICAL VISIT FOR ILLNESS FOLLOW-UP CALL-LUDY-JOSE Menezes RN-BSN  [facility]     "

## 2022-08-08 NOTE — ED TRIAGE NOTES
Patient presents to ED for evaluation of right flank pain.  Per patient seen here on Saturday after a fall, imaging done and nothing found. Patient today began having right flank pain, 10/10. Moaning out but able to talk in sentences.     Triage Assessment     Row Name 08/08/22 0872       Triage Assessment (Adult)    Airway WDL WDL       Respiratory WDL    Respiratory WDL WDL       Skin Circulation/Temperature WDL    Skin Circulation/Temperature WDL WDL       Cardiac WDL    Cardiac WDL WDL       Peripheral/Neurovascular WDL    Peripheral Neurovascular WDL WDL       Cognitive/Neuro/Behavioral WDL    Cognitive/Neuro/Behavioral WDL WDL

## 2022-08-08 NOTE — ED PROVIDER NOTES
EMERGENCY DEPARTMENT ENCOUNTER      NAME: Danna Adams  AGE: 82 year old female  YOB: 1940  MRN: [id number]  EVALUATION DATE & TIME: No admission date for patient encounter.    PCP: David Dennis    ED PROVIDER: Aashish Cheema PA-C      Chief Complaint   Patient presents with     Flank Pain         FINAL IMPRESSION:  1. Chest wall pain    2. Musculoskeletal chest pain          ED COURSE & MEDICAL DECISION MAKING:    Pertinent Labs & Imaging studies reviewed. (See chart for details)  8:37 AM I met the patient and performed my initial interview and exam. Staffed with Dr. Win MD.  11:09 AM Patient seen and reevaluated, updated on laboratory studies. Significant anxiety component. Pending UA.   12:01 PM Informed by nursing that the patients urine was sent without a label on it. Will need to collect another UA as lab will not accept it. Delay in discharge due to this.   12:43 PM Reviewed results, pain improved, plan for discharge.     82 year old female presents to the Emergency Department for evaluation of right anterior chest wall pain.     ED Course as of 08/08/22 1244   Mon Aug 08, 2022   0849 Patient is an 82-year-old female with past medical history of a fall, seen in the emergency department on 8/6 for evaluation of elbow pain, chest pain.  Patient had x-rays of her right elbow, CT scans of the chest and head, which were negative, she was discharged home with a few doses of pain medicine, the recommendation that she was with her primary care provider.  She does not take any blood thinners.  Today she presents emergency department for evaluation of worsening right-sided chest wall pain, without any focal area of tenderness.  Patient is intermittently screaming out in triage, yelling that she needs someone to help her and to fix her pain.  On initial entry into the waiting room to find the patient, she was sitting comfortably, and quietly, and it was only upon approaching her that  she began yelling out intermittently.  She does present here with her .  No significant past medical history, no other injury since she was seen on the sixth.  She notes that she has been taking Percocet at home she had leftover from a breast reduction surgery for her pain.  She has not taken any ibuprofen or Tylenol.  She was noted to be report that her's.  On examination lung sounds are clear bilaterally, she is complaining of pain primarily on the right anterior chest, however there is no focal bruising, bony abnormality, or focal area of tenderness.  No overlying ecchymosis.  No masses or lesions.  Her abdominal examination is unremarkable.  She denies any urinary symptoms.  Denies any history of kidney stones.  Denies any dysuria or hematuria.  Patient is able to speak in full sentences.  Does not appear to be neurological in origin.  Patient does know that she takes gabapentin at home for restless leg syndrome.     0854 Given that this is the second time the patient has presented to the emergency department, and the pain is worsened, appears to be right anterior chest wall, right upper quadrant abdominal area, will evaluate for cholecystitis, choledocholithiasis, acute cystitis, or other abdominal etiology.  Suspect this is most likely musculoskeletal in nature, however we will review CT her chest to make sure that there is no evidence of pneumothorax, or worsening pleural injury.   0948 Chest CT w/o contrast  CT abd does not show any abnormalities.    1109 Laboratory studies are unremarkable.  Pending urinalysis.  Patient is giving a urine sample right now, pain is improved.  She is significantly anxious about her elevated blood pressure, however is not having any other focal neurological symptoms.  No tach there is any indication to do further work-up at this time.  Pain seems to be musculoskeletal, helped with Flexeril, as well as Toradol.   1243 Analysis unremarkable, suspect this is musculoskeletal  in nature.  Recommend continuing lidocaine patches, Flexeril, ibuprofen and Tylenol at home.  Blood pressure is improved, she is reassured about this.  Plan for discharge, recommend follow-up with primary care if she continues to have pain.        At the conclusion of the encounter I discussed the results of all of the tests and the disposition. The questions were answered. The patient or family acknowledged understanding and was agreeable with the care plan.       0 minutes of critical care time     MEDICATIONS GIVEN IN THE EMERGENCY:  Medications   Lidocaine (LIDOCARE) 4 % Patch 1 patch (1 patch Transdermal Patch/Med Applied 8/8/22 0906)   cyclobenzaprine (FLEXERIL) tablet 10 mg (10 mg Oral Given 8/8/22 0906)   ketorolac (TORADOL) injection 15 mg (15 mg Intramuscular Given 8/8/22 0906)       NEW PRESCRIPTIONS STARTED AT TODAY'S ER VISIT  New Prescriptions    CYCLOBENZAPRINE (FLEXERIL) 10 MG TABLET    Take 1 tablet (10 mg) by mouth 3 times daily as needed for muscle spasms          =================================================================    HPI    Patient information was obtained from: Patient    Use of : N/A       Danna Adams is a 82 year old female with a pertinent history of hyperlipidemia who presents to this ED via private car for evaluation of chest wall pain.    The patient reports that she fell on Saturday (8/6). She recently had a CT scan of her ribs done which did not show any abnormalities. Complains today of intermittent chest wall pain that radiates to her right lower back. The pain does not radiate to her legs. She has taken 2 doses of hydrocodone for pain. Patient has no history of kidney stones. She is not on any blood thinning medication. She does take ropinirole and gabapentin daily for restless leg syndrome and neuropathy. Denies urinary symptoms, abdominal pain, or any other complaints at this time.      REVIEW OF SYSTEMS   Review of Systems   Gastrointestinal: Negative  for abdominal pain.   Genitourinary: Negative for decreased urine volume, dysuria and frequency.   Musculoskeletal: Positive for back pain.        Positive for chest wall pain.   All other systems reviewed and are negative.       PAST MEDICAL HISTORY:  Past Medical History:   Diagnosis Date     Anemia      Arthritis      Breast cancer (H)      Complication of anesthesia      Heart murmur, systolic      Restless leg syndrome        PAST SURGICAL HISTORY:  Past Surgical History:   Procedure Laterality Date     EYE SURGERY Left 2012     FOOT SURGERY Right      JOINT REPLACEMENT Right     hip     MASTECTOMY Bilateral      REMOVE IMPLANT BREAST Bilateral 5/26/2022    Procedure: Explantation of Bilateral Breast Implant. Bilateral Capsulectomy. Intermediate closure 15 cm Right Mastectomy scar. Intermediate closure 16 cm Left Mastectomy scar.;  Surgeon: Regino Ramirez MD;  Location: [facility] TOTAL KNEE ARTHROPLASTY Left 04/12/2016    Procedure: KNEE TOTAL ARTHROPLASTY, LEFT;  Surgeon: Joseluis Smiley MD;  Location: [facility];  Service: Orthopedics           CURRENT MEDICATIONS:    cyclobenzaprine (FLEXERIL) 10 MG tablet  furosemide (LASIX) 20 MG tablet  gabapentin (NEURONTIN) 300 MG capsule  ibuprofen (ADVIL/MOTRIN) 600 MG tablet  ondansetron (ZOFRAN ODT) 4 MG ODT tab  rOPINIRole (REQUIP) 0.5 MG tablet  senna-docusate (SENOKOT-S/PERICOLACE) 8.6-50 MG tablet  TRAZodone (DESYREL) 50 MG tablet         ALLERGIES:  Allergies   Allergen Reactions     Adhesive Tape Rash     Adhesive & Medical glue- broke out in rash       FAMILY HISTORY:  History reviewed. No pertinent family history.    SOCIAL HISTORY:   Social History     Socioeconomic History     Marital status:    Tobacco Use     Smoking status: Never Smoker     Smokeless tobacco: Never Used   Substance and Sexual Activity     Alcohol use: Yes     Drug use: Never       VITALS:  BP (!) 166/80   Pulse 76   Temp 97.6  F (36.4  C) (Temporal)    "Resp 16   Ht 1.626 m (5' 4\")   Wt 74.8 kg (165 lb)   SpO2 96%   BMI 28.32 kg/m      PHYSICAL EXAM    Physical Exam  Vitals and nursing note reviewed.   Constitutional:       General: She is not in acute distress.     Appearance: Normal appearance. She is normal weight. She is not toxic-appearing or diaphoretic.   HENT:      Right Ear: External ear normal.      Left Ear: External ear normal.   Eyes:      Conjunctiva/sclera: Conjunctivae normal.   Cardiovascular:      Rate and Rhythm: Normal rate and regular rhythm.      Heart sounds: Normal heart sounds. No murmur heard.    No friction rub. No gallop.   Pulmonary:      Effort: Pulmonary effort is normal. No respiratory distress.      Breath sounds: No wheezing or rales.   Chest:      Chest wall: Tenderness present.      Comments: Mild right lower anterior chest wall tenderness, no focal area of tenderness.   Abdominal:      General: Abdomen is flat. There is no distension.      Palpations: Abdomen is soft.      Tenderness: There is no abdominal tenderness. There is no right CVA tenderness, left CVA tenderness, guarding or rebound.   Neurological:      Mental Status: She is alert. Mental status is at baseline.         LAB:  All pertinent labs reviewed and interpreted.  Labs Ordered and Resulted from Time of ED Arrival to Time of ED Departure   ROUTINE UA WITH MICROSCOPIC REFLEX TO CULTURE - Normal       Result Value    Color Urine Colorless      Appearance Urine Clear      Glucose Urine Negative      Bilirubin Urine Negative      Ketones Urine Negative      Specific Gravity Urine 1.006      Blood Urine Negative      pH Urine 6.5      Protein Albumin Urine Negative      Urobilinogen Urine <2.0      Nitrite Urine Negative      Leukocyte Esterase Urine Negative      RBC Urine 1      WBC Urine 1     COMPREHENSIVE METABOLIC PANEL - Normal    Sodium 136      Potassium 4.4      Chloride 101      Carbon Dioxide (CO2) 27      Anion Gap 8      Urea Nitrogen 15      " Creatinine 0.73      Calcium 9.4      Glucose 94      Alkaline Phosphatase 75      AST 18      ALT 17      Protein Total 6.8      Albumin 3.5      Bilirubin Total 0.4      GFR Estimate 81     LIPASE - Normal    Lipase 16     CBC WITH PLATELETS AND DIFFERENTIAL    WBC Count 8.7      RBC Count 4.07      Hemoglobin 11.7      Hematocrit 36.1      MCV 89      MCH 28.7      MCHC 32.4      RDW 13.2      Platelet Count 361      % Neutrophils 70      % Lymphocytes 21      % Monocytes 6      % Eosinophils 2      % Basophils 0      % Immature Granulocytes 1      NRBCs per 100 WBC 0      Absolute Neutrophils 6.2      Absolute Lymphocytes 1.8      Absolute Monocytes 0.5      Absolute Eosinophils 0.2      Absolute Basophils 0.0      Absolute Immature Granulocytes 0.0      Absolute NRBCs 0.0         RADIOLOGY:  Reviewed all pertinent imaging. Please see official radiology report.  Chest CT w/o contrast   Final Result   IMPRESSION:       1.  No significant change since recent chest CT.      2.  No obvious findings to explain symptoms.                   PROCEDURES:   None    I, Lona Crespo, am serving as a scribe to document services personally performed by Aashish Cheema PA-C, based on my observation and the provider's statements to me. I, Aashish Cheema PA-C, attest that Lona Crespo is acting in a scribe capacity, has observed my performance of the services and has documented them in accordance with my direction.    Aashish Cheema PA-C  Emergency Medicine  [facility] EMERGENCY DEPARTMENT  [facility] [facility] [address]  [phone]  Dept: [phone]     Aashish Cheema PA-C  08/08/22 2236

## 2022-08-08 NOTE — DISCHARGE INSTRUCTIONS
You are seen here in the emergency department for evaluation of right anterior chest wall pain.  Your pain was improved after some Flexeril, as well as a Toradol shot, and some lidocaine patches.  Your work-up here was unremarkable.  Your CT of your chest did not show any evidence of any broken ribs, or other etiology.  Your remainder of your laboratory studies were unremarkable.  No evidence of urinary tract infection.  I recommend that you continue to take your pain medicine at home, as well as ibuprofen and Tylenol, dosage recommendations are included below.  Additionally we will send you with some of the muscle relaxant that we gave you here in the emergency department, please follow-up with your primary care provider in 1 to 2 weeks for reevaluation.    For pain or fever you may use:  -Tylenol 650 mg every 6 hours.  Max 4000 mg in 24 hours  Do not use thismedication with alcohol as it can cause liver problems.  -Ibuprofen 600 mg every 6 hours.  Max 3500 mg in 24 hours  Do not take this medication if you have a history of a GI bleed or have kidney problems.  You may use both of these medications at the same time or you can alternate them every 3 hours.  For example, Tylenol at 6 AM, ibuprofen at 9 AM, Tylenol at noon, etc.

## 2022-08-08 NOTE — ED PROVIDER NOTES
"Emergency Department Midlevel Supervisory Note     I personally saw the patient and performed a substantive portion of the visit including all aspects of the medical decision making.    ED Course:  9:27 AM Yousif Cheema PA-C staffed patient with me. I agree with their assessment and plan of management, and I will see the patient.  *** I met with the patient to introduce myself, gather additional history, perform my initial exam, and discuss the plan.     Brief HPI:     Danna Adams is a 82 year old female who presents for evaluation of chest wall pain.    The patient reports that she fell on Saturday (8/6). She recently had a CT scan of her ribs done which did not show any abnormalities. Complains today of intermittent chest wall pain that radiates to her right lower back. The pain does not radiate to her legs. She has taken 2 doses of hydrocodone for pain.    I, David Wilhelm, am serving as a scribe to document services personally performed by Alonzo Walden MD based on my observations and the provider's statements to me.   I, Alonzo Walden MD, attest that David Wilhelm was acting in a scribe capacity, has observed my performance of the services and has documented them in accordance with my direction.    Brief Physical Exam: /65   Pulse 74   Temp 97.6  F (36.4  C) (Temporal)   Resp 16   Ht 1.626 m (5' 4\")   Wt 74.8 kg (165 lb)   SpO2 96%   BMI 28.32 kg/m    Constitutional:  Alert, in no acute distress  EYES: Conjunctivae clear  HENT:  Atraumatic, normocephalic  Respiratory:  Respirations even, unlabored, in no acute respiratory distress  Cardiovascular:  Regular rate and rhythm, good peripheral perfusion  GI: Soft, nondistended, nontender, no palpable masses, no rebound, no guarding   Musculoskeletal:  No edema. No cyanosis. Range of motion major extremities intact.    Integument: Warm, Dry, No erythema, No rash.   Neurologic:  Alert & oriented, no focal deficits noted  Psych: Normal mood " and affect     MDM:  ***  ED Course as of 08/08/22 0955   Mon Aug 08, 2022   0861 Patient is an 82-year-old female with past medical history of a fall, seen in the emergency department on 8/6 for evaluation of elbow pain, chest pain.  Patient had x-rays of her right elbow, CT scans of the chest and head, which were negative, she was discharged home with a few doses of pain medicine, the recommendation that she was with her primary care provider.  She does not take any blood thinners.  Today she presents emergency department for evaluation of worsening right-sided chest wall pain, without any focal area of tenderness.  Patient is intermittently screaming out in triage, yelling that she needs someone to help her and to fix her pain.  On initial entry into the waiting room to find the patient, she was sitting comfortably, and quietly, and it was only upon approaching her that she began yelling out intermittently.  She does present here with her .  No significant past medical history, no other injury since she was seen on the sixth.  She notes that she has been taking Percocet at home she had leftover from a breast reduction surgery for her pain.  She has not taken any ibuprofen or Tylenol.  She was noted to be report that her's.  On examination lung sounds are clear bilaterally, she is complaining of pain primarily on the right anterior chest, however there is no focal bruising, bony abnormality, or focal area of tenderness.  No overlying ecchymosis.  No masses or lesions.  Her abdominal examination is unremarkable.  She denies any urinary symptoms.  Denies any history of kidney stones.  Denies any dysuria or hematuria.  Patient is able to speak in full sentences.  Does not appear to be neurological in origin.  Patient does know that she takes gabapentin at home for restless leg syndrome.     0854 Given that this is the second time the patient has presented to the emergency department, and the pain is worsened,  appears to be right anterior chest wall, right upper quadrant abdominal area, will evaluate for cholecystitis, choledocholithiasis, acute cystitis, or other abdominal etiology.  Suspect this is most likely musculoskeletal in nature, however we will review CT her chest to make sure that there is no evidence of pneumothorax, or worsening pleural injury.   0948 Chest CT w/o contrast  CT abd does not show any abnormalities.        No diagnosis found.    Labs and Imaging:  Results for orders placed or performed during the hospital encounter of 08/08/22   Chest CT w/o contrast    Impression    IMPRESSION:     1.  No significant change since recent chest CT.    2.  No obvious findings to explain symptoms.           I have reviewed the relevant laboratory and radiology studies    Procedures:  I was present for the key portions of this procedure: ***none    Alonzo Walden MD  [facility] EMERGENCY DEPARTMENT  [address] [place] [id number] [phone]

## 2022-08-16 ENCOUNTER — APPOINTMENT (OUTPATIENT)
Dept: RADIOLOGY | Facility: HOSPITAL | Age: 82
End: 2022-08-16
Attending: EMERGENCY MEDICINE
Payer: MEDICARE

## 2022-08-16 ENCOUNTER — HOSPITAL ENCOUNTER (EMERGENCY)
Facility: HOSPITAL | Age: 82
Discharge: HOME | End: 2022-08-16
Admitting: PHYSICIAN ASSISTANT
Payer: MEDICARE

## 2022-08-16 VITALS
RESPIRATION RATE: 22 BRPM | TEMPERATURE: 97.8 F | DIASTOLIC BLOOD PRESSURE: 79 MMHG | SYSTOLIC BLOOD PRESSURE: 164 MMHG | OXYGEN SATURATION: 100 % | HEIGHT: 64 IN | HEART RATE: 77 BPM | BODY MASS INDEX: 28.17 KG/M2 | WEIGHT: 165 LBS

## 2022-08-16 DIAGNOSIS — S52.615A CLOSED NONDISPLACED FRACTURE OF STYLOID PROCESS OF LEFT ULNA, INITIAL ENCOUNTER: ICD-10-CM

## 2022-08-16 DIAGNOSIS — S52.502A CLOSED FRACTURE OF DISTAL END OF LEFT RADIUS, UNSPECIFIED FRACTURE MORPHOLOGY, INITIAL ENCOUNTER: ICD-10-CM
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
## 2022-08-16 LAB — HOLD SPECIMEN: NORMAL

## 2022-08-16 PROCEDURE — 250N000013 HC RX MED GY IP 250 OP 250 PS 637: Performed by: PHYSICIAN ASSISTANT

## 2022-08-16 PROCEDURE — 73590 X-RAY EXAM OF LOWER LEG: CPT | Mod: LT

## 2022-08-16 PROCEDURE — 250N000013 HC RX MED GY IP 250 OP 250 PS 637: Performed by: EMERGENCY MEDICINE

## 2022-08-16 PROCEDURE — 73610 X-RAY EXAM OF ANKLE: CPT | Mod: LT

## 2022-08-16 PROCEDURE — 73110 X-RAY EXAM OF WRIST: CPT | Mod: LT

## 2022-08-16 PROCEDURE — 25605 CLTX DST RDL FX/EPHYS SEP W/: CPT | Mod: LT

## 2022-08-16 PROCEDURE — 99285 EMERGENCY DEPT VISIT HI MDM: CPT | Mod: 25

## 2022-08-16 RX ORDER — OXYCODONE HYDROCHLORIDE 5 MG/1
5 TABLET ORAL EVERY 8 HOURS PRN
Qty: 9 TABLET | Refills: 0 | Status: SHIPPED | OUTPATIENT
Start: 2022-08-16 | End: 2022-08-19

## 2022-08-16 RX ADMIN — OXYCODONE HYDROCHLORIDE 2.5 MG: 5 TABLET ORAL at 18:02

## 2022-08-16 RX ADMIN — ACETAMINOPHEN AND CODEINE PHOSPHATE 2 TABLET: 300; 30 TABLET ORAL at 16:42

## 2022-08-16 ASSESSMENT — ENCOUNTER SYMPTOMS
WOUND: 0
FEVER: 0
ABDOMINAL PAIN: 0
WEAKNESS: 0
VOMITING: 0
BRUISES/BLEEDS EASILY: 0
ARTHRALGIAS: 1
SHORTNESS OF BREATH: 0
NUMBNESS: 0
HEADACHES: 0

## 2022-08-16 ASSESSMENT — ACTIVITIES OF DAILY LIVING (ADL): ADLS_ACUITY_SCORE: 33

## 2022-08-16 NOTE — ED TRIAGE NOTES
amb to triage.  pts tates having pain in L wrist-deformity noted in triage after a fall about 1 hour ago.  Carrying laundry down stairs and slipped.  Grabbed the rail with L hand.  Radial pulse present and sensation normal in distal L upper extremity.  Denies hitting head or LOC.  Denies pain in neck.  Pt reports some soreness in L foot also but able to ambulate and declines w/c.  Ice in place on L wrist on arrival.  Denies blood thinners.  GCS-15.  Sling applied in triage.      Triage Assessment     Row Name 08/16/22 8186       Triage Assessment (Adult)    Airway WDL WDL       Respiratory WDL    Respiratory WDL WDL       Skin Circulation/Temperature WDL    Skin Circulation/Temperature WDL WDL       Cardiac WDL    Cardiac WDL WDL       Peripheral/Neurovascular WDL    Peripheral Neurovascular WDL WDL;capillary refill       Cognitive/Neuro/Behavioral WDL    Cognitive/Neuro/Behavioral WDL WDL

## 2022-08-16 NOTE — ED PROVIDER NOTES
EMERGENCY DEPARTMENT ENCOUNTER      NAME: Danna Adams  AGE: 82 year old female  YOB: 1940  MRN: [id number]  EVALUATION DATE & TIME: 8/16/2022  4:51 PM    PCP: David Dennis    ED PROVIDER: Sravani Dennis PA-C      Chief Complaint   Patient presents with     Fall         FINAL IMPRESSION:  1. Closed fracture of distal end of left radius, unspecified fracture morphology, initial encounter    2. Closed nondisplaced fracture of styloid process of left ulna, initial encounter    3. Fall, initial encounter          MEDICAL DECISION MAKING:    Pertinent Labs & Imaging studies reviewed. (See chart for details)  82 year old female presents to the Emergency Department for evaluation of left wrist pain and deformity and left ankle pain after tripping and falling up stairs while carrying laundry tonight. No head injury or loss of consciousness.    Vitals reviewed and notable for elevated blood pressure, likely secondary to pain. On exam, no evidence of head injury. Cervical spine cleared. 2+ dorsalis pedis and radial pulses. Obvious deformity to left wrist. Distal sensation intact. She is able to move all fingers without difficulty. Brisk capillary refill. No open wounds. She is able to perform full ROM her left ankle. No point tenderness. No soft tissue swelling. She is able to bear weight. Normal sensation and brisk capillary refill. Remainder of head to toe trauma exam is unremarkable. Differential diagnosis includes but not limited to fracture, dislocation, ligamentous injury.    Patient was given oral analgesics. Xray left wrist with comminuted fracture of the distal radius with impaction and dorsally and volarly displaced fragments. Equivocal nondisplaced fracture of the ulnar styloid. Xray left tib/fib and ankle unremarkable consistent with exam. She is able to bear weight without difficulty and perform full ROM with no point tenderness. Hematoma block to left wrist followed by gentle traction at  the fracture site and then utilized finger traps. Alignment improved and she was splinted in sugar tong splint. Discussed pain management, risks of narcotics, return precautions and follow up with orthopedics in a few days. Patient and  expressed understanding and patient discharged home in stable condition.     0 minutes of critical care time     ED COURSE:  5:19 PM I met with the patient, obtained history, performed an initial exam, and discussed options and plan for diagnostics and treatment here in the ED.  6:14 PM Patient discharged after being provided with extensive anticipatory guidance and given return precautions, importance of PCP follow-up emphasized.    At the conclusion of the encounter I discussed the results of all of the tests and the disposition. The questions were answered. The patient and family acknowledged understanding and were agreeable with the care plan.     MEDICATIONS GIVEN IN THE EMERGENCY:  Medications   acetaminophen-codeine (TYLENOL #3) 300-30 MG per tablet 2 tablet (2 tablets Oral Given 8/16/22 1642)   oxyCODONE IR (ROXICODONE) half-tab 2.5 mg (2.5 mg Oral Given 8/16/22 1802)       NEW PRESCRIPTIONS STARTED AT TODAY'S ER VISIT  Discharge Medication List as of 8/16/2022  6:38 PM      START taking these medications    Details   oxyCODONE (ROXICODONE) 5 MG tablet Take 1 tablet (5 mg) by mouth every 8 hours as needed for pain, Disp-9 tablet, R-0, Local Print                  =================================================================    HPI:    Patient information was obtained from: Patient    Use of Interpretor: N/A      Danna Adams is a 82 year old female with a pertinent history of breast cancer s/p mastectomy, osteoarthritis, peripheral neuropathy who presents to this ED via private vehicle for evaluation of fall.    1 hour PTA patient was carrying laundry up the stairs when she tripped and fell. She grabbed onto the railing and twisted her left wrist and her left  ankle. She reports a deformity and severe pain to her left wrist. She denies hitting her head or loss of consciousness. She is not anticoagulated.    REVIEW OF SYSTEMS:  Review of Systems   Constitutional: Negative for fever.   Respiratory: Negative for shortness of breath.    Cardiovascular: Negative for chest pain.   Gastrointestinal: Negative for abdominal pain and vomiting.   Musculoskeletal: Positive for arthralgias (left wrist and left ankle pain).   Skin: Negative for wound.   Neurological: Negative for syncope, weakness, numbness and headaches.   Hematological: Does not bruise/bleed easily.   All other systems reviewed and are negative.      PAST MEDICAL HISTORY:  Past Medical History:   Diagnosis Date     Anemia      Arthritis      Breast cancer (H)      Complication of anesthesia      Heart murmur, systolic      Restless leg syndrome        PAST SURGICAL HISTORY:  Past Surgical History:   Procedure Laterality Date     EYE SURGERY Left 2012     FOOT SURGERY Right      JOINT REPLACEMENT Right     hip     MASTECTOMY Bilateral      REMOVE IMPLANT BREAST Bilateral 5/26/2022    Procedure: Explantation of Bilateral Breast Implant. Bilateral Capsulectomy. Intermediate closure 15 cm Right Mastectomy scar. Intermediate closure 16 cm Left Mastectomy scar.;  Surgeon: Regino Ramirez MD;  Location: [facility] TOTAL KNEE ARTHROPLASTY Left 04/12/2016    Procedure: KNEE TOTAL ARTHROPLASTY, LEFT;  Surgeon: Joseluis Smiley MD;  Location: [facility];  Service: Orthopedics           CURRENT MEDICATIONS:    No current facility-administered medications for this encounter.    Current Outpatient Medications:      oxyCODONE (ROXICODONE) 5 MG tablet, Take 1 tablet (5 mg) by mouth every 8 hours as needed for pain, Disp: 9 tablet, Rfl: 0     furosemide (LASIX) 20 MG tablet, Take 1 tablet by mouth daily, Disp: , Rfl:      gabapentin (NEURONTIN) 300 MG capsule, Take 300 mg by mouth 3 times daily Patient usually  "takes as needed, Disp: , Rfl:      ibuprofen (ADVIL/MOTRIN) 600 MG tablet, ibuprofen 600 mg tablet  TAKE 1 TABLET BY MOUTH FOUR TIMES DAILY AS NEEDED FOR PAIN, Disp: , Rfl:      ondansetron (ZOFRAN ODT) 4 MG ODT tab, Take 1 tablet (4 mg) by mouth every 8 hours as needed for nausea, Disp: 9 tablet, Rfl: 0     rOPINIRole (REQUIP) 0.5 MG tablet, Take 1 mg by mouth 3 times daily, Disp: , Rfl:      senna-docusate (SENOKOT-S/PERICOLACE) 8.6-50 MG tablet, Take 1-2 tablets by mouth 2 times daily, Disp: 30 tablet, Rfl: 0     TRAZodone (DESYREL) 50 MG tablet, Take 50 mg by mouth At Bedtime., Disp: , Rfl:       ALLERGIES:  Allergies   Allergen Reactions     Adhesive Tape Rash     Adhesive & Medical glue- broke out in rash       FAMILY HISTORY:  No family history on file.    SOCIAL HISTORY:   Social History     Socioeconomic History     Marital status:    Tobacco Use     Smoking status: Never Smoker     Smokeless tobacco: Never Used   Substance and Sexual Activity     Alcohol use: Yes     Drug use: Never       VITALS:  Patient Vitals for the past 24 hrs:   BP Temp Temp src Pulse Resp SpO2 Height Weight   08/16/22 1830 (!) 164/79 -- -- -- -- -- -- --   08/16/22 1815 (!) 202/86 -- -- -- -- -- -- --   08/16/22 1410 -- -- -- 77 -- 100 % -- --   08/16/22 1406 (!) 139/92 97.8  F (36.6  C) Temporal -- 22 -- 1.626 m (5' 4\") 74.8 kg (165 lb)       PHYSICAL EXAM  Constitutional: Well developed, Well nourished, NAD  HENT: Normocephalic, Atraumatic, Bilateral external ears normal, Oropharynx normal, mucous membranes moist, Nose normal.   Neck: Normal range of motion, No midline cervical spine tenderness, Supple, No stridor.  Eyes: Conjunctiva normal, No discharge.   Respiratory: Normal breath sounds, No respiratory distress, No wheezing, Speaks full sentences easily. No cough.  Cardiovascular: Normal heart rate, Regular rhythm, No murmurs, No rubs, No gallops. Chest wall nontender.  GI: Soft, No tenderness, No masses, No flank " tenderness. No rebound or guarding.  Musculoskeletal: 2+ DP and radial pulses. Obvious deformity to left wrist. Distal sensation intact. She is able to move all fingers without difficulty. Radial pulse 2+ and symmetric. Sensation intact. Brisk capillary refill. No open wounds. She is able to perform full ROM her left ankle. No point tenderness. No soft tissue swelling. She is able to bear weight. Normal sensation and brisk capillary refill.  Integument: Warm, Dry, No erythema, No rash. No petechiae.  Neurologic: Alert & oriented x 3, Normal motor function, Normal sensory function, No focal deficits noted. Normal gait.  Psychiatric: Affect normal, Judgment normal, Mood normal. Cooperative.    LAB:  All pertinent labs reviewed and interpreted.  Recent Results (from the past 24 hour(s))   Extra Urine Collection    Collection Time: 08/16/22  1:42 PM   Result Value Ref Range    Hold Specimen JIC          RADIOLOGY:  Reviewed all pertinent imaging. Please see official radiology report.  XR Ankle Left G/E 3 Views   Final Result   IMPRESSION:    Tibia-fibula: No acute fracture. Small cortical linear lucency involving the midshaft of the fibula medially should represent a prominent vascular channel. If pain persists consider follow-up radiographs in 7-10 days. There is diffuse bony    demineralization. Tiny lucent focus in the tibial shaft is nonspecific but likely incidental and benign.      Ankle: Normal joint spaces and alignment. No acute fracture. Mild subchondral lucency medial talar dome could represent summation artifact or osteochondral lesion lesion, likely not acute.      XR Wrist Left G/E 3 Views   Final Result   IMPRESSION: Comminuted fracture of the distal radius with impaction of the fracture at the metaphysis and dorsally and volarly displaced fragments. Equivocal nondisplaced fracture of the ulnar styloid.      Underlying diffuse bony demineralization. Chondrocalcinosis. Degenerative arthritis of the STT  joint.      XR Tibia and Fibula Left 2 Views   Final Result   IMPRESSION:    Tibia-fibula: No acute fracture. Small cortical linear lucency involving the midshaft of the fibula medially should represent a prominent vascular channel. If pain persists consider follow-up radiographs in 7-10 days. There is diffuse bony    demineralization. Tiny lucent focus in the tibial shaft is nonspecific but likely incidental and benign.      Ankle: Normal joint spaces and alignment. No acute fracture. Mild subchondral lucency medial talar dome could represent summation artifact or osteochondral lesion lesion, likely not acute.          PROCEDURES:     PROCEDURE:  1. Orthopedic Reduction    2. Splint Application   INDICATIONS:  left distal radius and ulna fracture   PROCEDURE PROVIDER: Sravani Dennis PA-C   MEDICATIONS: Hematoma block with 4 ml of lidocaine 1% WO epi   PROCEDURE NOTE: ORTHOPEDIC MANAGEMENT:  Bone/Joint Manipulation: Affected extremity was grasped and gradual traction was applied in-line with fracture. Placed in finger traps with weighted resistance until alignment improved    Alignment improved post procedure.     SPLINTING/IMMOBILIZATION:   A Sugar tong splint made of Plaster was applied.  After placement I checked and adjusted the fit to ensure proper positioning. Patient was more comfortable with the splint in place.  Sensation and circulation are intact after splint placement.   COMPLICATIONS: Patient tolerated procedure well, without complication       Sravani Dennis PA-C  Emergency Medicine  [facility]  8/16/2022     Sravani Dennis PA-C  08/18/22 8821

## 2022-08-16 NOTE — ED NOTES
ED Provider In Triage Note  [facility]  Encounter Date: Aug 16, 2022    No chief complaint on file.      Brief HPI:   Danna Adams is a 82 year old female, history of breast cancer, presenting to the Emergency Department with a chief complaint of left wrist and left ankle / foot pain after a fall that occurred ~1 hour ago. She was carrying clothes up the stairs and slipped. She grabbed the railing and twisted her wrist in the process and twisted her ankle.     She did not hit her head, sustain LOC and denies headache, neck pain. She is not anticoagulated.     Brief Physical Exam:  There were no vitals taken for this visit.  General: Non-toxic appearing  HEENT: Atraumatic  Resp: No respiratory distress  Abdomen: Non-peritoneal  Neuro: Alert, oriented, answers questions appropriately  Psych: Behavior appropriate    EXT: Swelling and tenderness to radial side of left wrist; CMS intact    Tenderness to palpation distal tibia / fibula and proximal fibula - CMS LLE intact    Plan Initiated in Triage:  Orders Placed This Encounter     XR Wrist Left G/E 3 Views     XR Ankle Left G/E 3 Views     XR Tibia and Fibula Left 2 Views     [place] Draw     Extra Urine Collection       PIT Dispo:   Return to lobby while awaiting workup and ED bed availability    Jacque Schmidt MD on 8/16/2022 at 1:39 PM    Patient was evaluated by the Physician in Triage due to a limitation of available rooms in the Emergency Department. A plan of care was discussed based on the information obtained on the initial evaluation and patient was consuled to return back to the Emergency Department lobby after this initial evalutaiton until results were obtained or a room became available in the Emergency Department. Patient was counseled not to leave prior to receiving the results of their workup.     Jacque Schmidt MD  [facility] EMERGENCY DEPARTMENT  [facility] [facility]  [address]  [phone]       Jacque Schmidt MD  08/16/22 5969

## 2022-08-16 NOTE — DISCHARGE INSTRUCTIONS
You have a fracture of your left radius.  You need to follow-up with [place] [facility] towards the end of the week.  Please call tomorrow morning to schedule your follow-up appointment.  Use Tylenol 1000 mg every 8 hours, oxycodone 5 mg every 8 hours as needed for severe pain. Keep in sling for comfort. Do not get splint wet. Elevate when resting on a pillow.    Take medications as prescribed.  Be aware they can make you sleepy or drowsy so do not drive while taking, do other dangerous activities, or mix with other sedatives or alcohol.  You should also know that these medications can be addictive.    If you develop severe uncontrolled pain, significant swelling, loss of sensation in your fingertips or your fingertips are turning blue or any other concerning symptoms return to the emergency department.

## 2024-10-08 ENCOUNTER — HOSPITAL ENCOUNTER (EMERGENCY)
Facility: CLINIC | Age: 84
Discharge: HOME | End: 2024-10-08
Attending: EMERGENCY MEDICINE | Admitting: EMERGENCY MEDICINE
Payer: MEDICARE

## 2024-10-08 VITALS
HEIGHT: 62 IN | DIASTOLIC BLOOD PRESSURE: 63 MMHG | TEMPERATURE: 97.2 F | RESPIRATION RATE: 22 BRPM | BODY MASS INDEX: 29.44 KG/M2 | SYSTOLIC BLOOD PRESSURE: 138 MMHG | HEART RATE: 100 BPM | WEIGHT: 160 LBS | OXYGEN SATURATION: 99 %

## 2024-10-08 DIAGNOSIS — G62.9 NEUROPATHY: ICD-10-CM

## 2024-10-08 DIAGNOSIS — G47.09 OTHER INSOMNIA: ICD-10-CM

## 2024-10-08 DIAGNOSIS — K14.0 GLOSSITIS: ICD-10-CM

## 2024-10-08 LAB
ANION GAP SERPL CALCULATED.3IONS-SCNC: 12 MMOL/L (ref 7–15)
BASOPHILS # BLD AUTO: 0 10E3/UL (ref 0–0.2)
BASOPHILS NFR BLD AUTO: 0 %
BUN SERPL-MCNC: 11.6 MG/DL (ref 8–23)
CALCIUM SERPL-MCNC: 9.8 MG/DL (ref 8.8–10.4)
CHLORIDE SERPL-SCNC: 101 MMOL/L (ref 98–107)
CREAT SERPL-MCNC: 0.63 MG/DL (ref 0.51–0.95)
EGFRCR SERPLBLD CKD-EPI 2021: 86 ML/MIN/1.73M2
EOSINOPHIL # BLD AUTO: 0.1 10E3/UL (ref 0–0.7)
EOSINOPHIL NFR BLD AUTO: 2 %
ERYTHROCYTE [DISTWIDTH] IN BLOOD BY AUTOMATED COUNT: 13.5 % (ref 10–15)
ETHANOL SERPL-MCNC: <0.01 G/DL
GLUCOSE SERPL-MCNC: 100 MG/DL (ref 70–99)
HCO3 SERPL-SCNC: 26 MMOL/L (ref 22–29)
HCT VFR BLD AUTO: 38.1 % (ref 35–47)
HGB BLD-MCNC: 12.1 G/DL (ref 11.7–15.7)
IMM GRANULOCYTES # BLD: 0.1 10E3/UL
IMM GRANULOCYTES NFR BLD: 1 %
LYMPHOCYTES # BLD AUTO: 2.1 10E3/UL (ref 0.8–5.3)
LYMPHOCYTES NFR BLD AUTO: 28 %
MCH RBC QN AUTO: 28.3 PG (ref 26.5–33)
MCHC RBC AUTO-ENTMCNC: 31.8 G/DL (ref 31.5–36.5)
MCV RBC AUTO: 89 FL (ref 78–100)
MONOCYTES # BLD AUTO: 0.5 10E3/UL (ref 0–1.3)
MONOCYTES NFR BLD AUTO: 7 %
NEUTROPHILS # BLD AUTO: 4.7 10E3/UL (ref 1.6–8.3)
NEUTROPHILS NFR BLD AUTO: 62 %
NRBC # BLD AUTO: 0 10E3/UL
NRBC BLD AUTO-RTO: 0 /100
PLATELET # BLD AUTO: 400 10E3/UL (ref 150–450)
POTASSIUM SERPL-SCNC: 4.4 MMOL/L (ref 3.4–5.3)
RBC # BLD AUTO: 4.27 10E6/UL (ref 3.8–5.2)
SODIUM SERPL-SCNC: 139 MMOL/L (ref 135–145)
WBC # BLD AUTO: 7.6 10E3/UL (ref 4–11)

## 2024-10-08 PROCEDURE — 99283 EMERGENCY DEPT VISIT LOW MDM: CPT

## 2024-10-08 PROCEDURE — 36415 COLL VENOUS BLD VENIPUNCTURE: CPT | Performed by: EMERGENCY MEDICINE

## 2024-10-08 PROCEDURE — 80048 BASIC METABOLIC PNL TOTAL CA: CPT | Performed by: EMERGENCY MEDICINE

## 2024-10-08 PROCEDURE — 82077 ASSAY SPEC XCP UR&BREATH IA: CPT | Performed by: EMERGENCY MEDICINE

## 2024-10-08 PROCEDURE — 85004 AUTOMATED DIFF WBC COUNT: CPT | Performed by: EMERGENCY MEDICINE

## 2024-10-08 RX ORDER — GABAPENTIN 100 MG/1
100 CAPSULE ORAL 3 TIMES DAILY
Qty: 21 CAPSULE | Refills: 0 | Status: SHIPPED | OUTPATIENT
Start: 2024-10-08

## 2024-10-08 ASSESSMENT — COLUMBIA-SUICIDE SEVERITY RATING SCALE - C-SSRS
1. IN THE PAST MONTH, HAVE YOU WISHED YOU WERE DEAD OR WISHED YOU COULD GO TO SLEEP AND NOT WAKE UP?: NO
2. HAVE YOU ACTUALLY HAD ANY THOUGHTS OF KILLING YOURSELF IN THE PAST MONTH?: NO
6. HAVE YOU EVER DONE ANYTHING, STARTED TO DO ANYTHING, OR PREPARED TO DO ANYTHING TO END YOUR LIFE?: NO

## 2024-10-08 ASSESSMENT — ACTIVITIES OF DAILY LIVING (ADL)
ADLS_ACUITY_SCORE: 38
ADLS_ACUITY_SCORE: 38

## 2024-10-08 NOTE — DISCHARGE INSTRUCTIONS
recommend you call your OB/GYN doctor today regarding your return of spotting after D&C.  Recommend start multivitamin including folate and B12.  Follow-up primary care doctor for recheck and vitamin testing.  Recommend start gabapentin again 100 mg 3 times a day until your doctor can figure out your neuropathy and glossitis.  Return to ER for fever or vomiting or abdominal pain

## 2024-10-08 NOTE — ED PROVIDER NOTES
"EMERGENCY DEPARTMENT ENCOUNTER      NAME: Danna Adams  AGE: 84 year old female  YOB: 1940  MRN: [id number]  EVALUATION DATE & TIME: No admission date for patient encounter.    PCP: David Dennis    ED PROVIDER: Klaus Acevedo MD      No chief complaint on file.        FINAL IMPRESSION:  No diagnosis found.      ED COURSE & MEDICAL DECISION MAKING:    Pertinent Labs & Imaging studies reviewed. (See chart for details)  84 year old female presents to the Emergency Department for evaluation of pain, worsening feet pain has no neuropathy.  Feet pain interfered with sleep last night    ED Course as of 10/08/24 0811   Tue Oct 08, 2024   0622 Patient is here with dry mouth and concerns about sleeping and worsening feet pain.  Reportedly has neuropathy   0622 Per review in Care Everywhere recently seen in OB/GYN clinic on October 1, 2024 as follow-up for [facility] for endometritis.  Instructed to finish Augmentin   0622 Per chart review history of depression and insomnia and has been on trazodone as well as Requip for restless leg   0628 Per chart review had a normal venous ultrasound in July 2024 looking at incompetent veins and is referred to [facility] for lymphedema therapy   0629 Patient had normal BMP in the [facility] system September 23, 2024   0639 Normal hgb A1c and tsh June 2024. Normal blood sugar 9/23/24.    0655 Patient is here with dry mouth and bilateral foot pain that is worse than usual.   0656 Reports neuropathy was not gabapentin for years but stopped 2 years ago.  Recent had D&C on 24 September.  He is on Augmentin which he says is her only new medicine.  Is on trazodone but no increased dose as well as Ditropan and Prozac   0656 So she could not sleep last night.  Reports he was up all night.  History of insomnia but this was one of the worst nights.  Did drink half cup of coffee at 1 AM.   0656 Did drink some honey whiskey last night.  Says she used a \"party\" a lot more prior to going " into the nursing home.   0656 On exam she has a dry fissured red beefy tongue consistent with glossitis, no evidence of thrush,   0657 She has strong peripheral pulses to her feet   0657 Patient with diabetes.  A1c and TSH normal in June.  Normal blood sugar on preop BMP September 23 0657 With glossitis and neuropathy concern for possible vitamin deficiency specially in setting of alcohol use.   0657 Doubt PAD, aortic dissection, thrush   0658 Patient reports she did develop vaginal bleeding that is very light the last couple days, bleeding had stopped after dilation and curettage last month. .  She is going to call her OB/GYN today.  No fever.  No pelvic pain.  No abdominal pain. no vomiting.   0658 Will obtain blood alcohol level, BMP, CBC   0658 Recommend follow-up primary care doctor for vitamin blood tests to evaluate for B12 deficiency, thiamine, folate   0658 Will start back on gabapentin for her neuropathy.     0803 WBC: 7.6   0803 Hemoglobin: 12.1   0803 Platelet Count: 400  Abdominal tenderness, no fever, no leukocytosis, doubt return endometritis.  Is on Augmentin.  Recommend patient call her gynecologist today   0805 Glossitis and worse neuropathy eyes expect she is got a vitamin deficiency   0805 Previously had normocytic anemia in the setting of vaginal bleeding.   0806 She is a moderate to heavy alcohol user, possible B12, B1, or folate deficiency.  Recommend she start supplementing those and take a multivitamin follow-up primary care doctor   0806 Start back on gabapentin for neuropathy interfering with her sleep.  Needs a follow-up primary care doctor for further evaluation   0806 Patient is going to call her gynecologist today.       Medical Decision Making  Obtained supplemental history:Supplemental history obtained?: Documented in chart  Reviewed external records: External records reviewed?: Documented in chart  Care impacted by chronic illness:Other: Insomnia  Did you consider but not order  tests?: Work up considered but not performed and documented in chart, if applicable  Did you interpret images independently?: Independent interpretation of ECG and images noted in documentation, when applicable.  Consultation discussion with other provider:Did you involve another provider (consultant, , pharmacy, etc.)?: No      MIPS: Not Applicable          Reassuring laboratory assessment  At the conclusion of the encounter I discussed the results of all of the tests and the disposition. The questions were answered. The patient or family acknowledged understanding and was agreeable with the care plan.       MEDICATIONS GIVEN IN THE EMERGENCY:  Medications - No data to display    NEW PRESCRIPTIONS STARTED AT TODAY'S ER VISIT  New Prescriptions    No medications on file          =================================================================    HPI    Patient information was obtained from: Patient as well as chart review    Danna Adams is a 84 year old female with a pertinent history of restless legs, depression, insomnia, who presents to this ED for evaluation of tongue pain, bilateral foot pain    Seen in the past.  Is been off gabapentin for roughly 2 years.  Recently had dilation and curettage for vaginal bleeding and found to have endometritis.  Is currently on Augmentin.  Last saw gynecology 1 week ago on October 1.  So developing some spotting from her vagina last couple days.  She had a call her gynecologist today.  Fever.  No abdominal pain.  No vomiting.  No dysuria.    Is on Requip, Ditropan, trazodone, fluoxetine, Augmentin    Has been recommended take B12 in the past only takes intermittently    Has been seen in the past for foot pain and is ultrasounds done as most recently as early summer.      REVIEW OF SYSTEMS   Review of Systems        PAST MEDICAL HISTORY:  Past Medical History:   Diagnosis Date    Anemia     Arthritis     Breast cancer (H)     Complication of anesthesia     Heart  murmur, systolic     Restless leg syndrome        PAST SURGICAL HISTORY:  Past Surgical History:   Procedure Laterality Date    EYE SURGERY Left 2012    FOOT SURGERY Right     JOINT REPLACEMENT Right     hip    MASTECTOMY Bilateral     REMOVE IMPLANT BREAST Bilateral 5/26/2022    Procedure: Explantation of Bilateral Breast Implant. Bilateral Capsulectomy. Intermediate closure 15 cm Right Mastectomy scar. Intermediate closure 16 cm Left Mastectomy scar.;  Surgeon: Regino Ramirez MD;  Location: [facility] TOTAL KNEE ARTHROPLASTY Left 04/12/2016    Procedure: KNEE TOTAL ARTHROPLASTY, LEFT;  Surgeon: Joseluis Smiley MD;  Location: [facility];  Service: Orthopedics           CURRENT MEDICATIONS:    furosemide (LASIX) 20 MG tablet  gabapentin (NEURONTIN) 300 MG capsule  ibuprofen (ADVIL/MOTRIN) 600 MG tablet  ondansetron (ZOFRAN ODT) 4 MG ODT tab  rOPINIRole (REQUIP) 0.5 MG tablet  senna-docusate (SENOKOT-S/PERICOLACE) 8.6-50 MG tablet  TRAZodone (DESYREL) 50 MG tablet        ALLERGIES:  Allergies   Allergen Reactions    Adhesive Tape Rash     Adhesive & Medical glue- broke out in rash       FAMILY HISTORY:  No family history on file.    SOCIAL HISTORY:   Social History     Socioeconomic History    Marital status:    Tobacco Use    Smoking status: Never    Smokeless tobacco: Never   Substance and Sexual Activity    Alcohol use: Yes    Drug use: Never       VITALS:  There were no vitals taken for this visit.    PHYSICAL EXAM      Vitals: There were no vitals taken for this visit.  General: Appears in no acute distress, awake, alert, interactive.  Eyes: Conjunctivae non-injected. Sclera anicteric.  HENT: Atraumatic.  Dry cracked cracked fissured beefy red tongue.  No white plaques  Neck: Supple.  Respiratory/Chest: Respiration unlabored.  Abdomen: non distended, no abdominal tenderness  Musculoskeletal: Normal extremities. No edema or erythema.  2+ dorsal pedal pulse and posterior tibial pulses.   Warm symmetric feet.  Cap refill less than 2 seconds.  Skin: Normal color. No rash or diaphoresis.  Neurologic: Face symmetric, moves all extremities spontaneously. Speech clear.  Psychiatric: Oriented to person, place, and time. Affect appropriate.    LAB:  All pertinent labs reviewed and interpreted.       RADIOLOGY:  Reviewed all pertinent imaging. Please see official radiology report.  No orders to display         Klaus Acevedo MD  [facility] EMERGENCY ROOM  [facility] [facility] [address] [phone]        Klaus Acevedo MD  10/08/24 0812

## (undated) DEVICE — DRSG KERLIX 4 1/2"X4YDS ROLL 6715

## (undated) DEVICE — STPL SKIN 35W 6.9MM  PXW35

## (undated) DEVICE — GLOVE SURG PI ULTRA TOUCH M SZ 6-1/2 LF

## (undated) DEVICE — GLOVE UNDER INDICATOR PI SZ 7.0 LF 41670

## (undated) DEVICE — CLOSURE SYS SKIN PREMIERPRO EXOFINFUSION 4X60CM 3473

## (undated) DEVICE — DRAIN RESERVOIR 100ML JP 0070740

## (undated) DEVICE — SUTURE VICRYL+ 2-0 CT-2 CR 8X18" VCP726D

## (undated) DEVICE — DRAPE CHEST 100X72X124 89227

## (undated) DEVICE — SPONGE LAP 18X18" X8435

## (undated) DEVICE — ESU PENCIL SMOKE EVAC W/ROCKER SWITCH 0703-047-000

## (undated) DEVICE — PREP CHLORAPREP 26ML TINTED HI-LITE ORANGE 930815

## (undated) DEVICE — DRSG DRAIN 4X4" 7086

## (undated) DEVICE — BLADE KNIFE SURG 15 371115

## (undated) DEVICE — BANDAGE ELASTIC 6"DBL LF STRL

## (undated) DEVICE — SUCTION MANIFOLD NEPTUNE 2 SYS 1 PORT 702-025-000

## (undated) DEVICE — DRAPE SHEET REV FOLD 3/4 9349

## (undated) DEVICE — DECANTER VIAL 2006S

## (undated) DEVICE — CUSTOM PACK GEN MAJOR SBA5BGMHEA

## (undated) DEVICE — ESU ELEC BLADE 6" COATED E1450-6

## (undated) DEVICE — PLATE GROUNDING ADULT W/CORD 9165L

## (undated) DEVICE — BANDAGE ELASTIC VELCRO 6IN REB3016

## (undated) DEVICE — SUTURE VICRYL+ 3-0 8-18 SH/CR VLT VCP774D

## (undated) DEVICE — Device

## (undated) DEVICE — SUCTION TIP YANKAUER W/O VENT K86

## (undated) DEVICE — DRSG GAUZE 2X2" TRAY 1806

## (undated) DEVICE — GLOVE GAMMEX NEOPRENE ULTRA SZ 7.5 LF 8515

## (undated) DEVICE — SU STRATAFIX MONOCRYL 3-0 SPIRAL PS-2 30CM SXMP1B106

## (undated) DEVICE — DRSG ABD TNDRSRB WET PRUF 8IN X 10IN STRL  9194A

## (undated) DEVICE — DRAIN BLAKE 19FR SIL 2231

## (undated) DEVICE — SURGICEL POWDER ABSORBABLE HEMOSTAT 3GM 3013SP

## (undated) DEVICE — SU VICRYL 4-0 PS-2 18" UND J496H

## (undated) DEVICE — GLOVE BIOGEL PI INDICATOR 8.0 LF 41680

## (undated) DEVICE — STPL SKIN SUBCUTICULAR INSORB  2030

## (undated) DEVICE — SOL WATER IRRIG 1000ML BOTTLE 2F7114

## (undated) RX ORDER — PROPOFOL 10 MG/ML
INJECTION, EMULSION INTRAVENOUS
Status: DISPENSED
Start: 2022-05-26

## (undated) RX ORDER — FENTANYL CITRATE-0.9 % NACL/PF 10 MCG/ML
PLASTIC BAG, INJECTION (ML) INTRAVENOUS
Status: DISPENSED
Start: 2022-05-26

## (undated) RX ORDER — ONDANSETRON 2 MG/ML
INJECTION INTRAMUSCULAR; INTRAVENOUS
Status: DISPENSED
Start: 2022-05-26

## (undated) RX ORDER — LIDOCAINE HYDROCHLORIDE 10 MG/ML
INJECTION, SOLUTION EPIDURAL; INFILTRATION; INTRACAUDAL; PERINEURAL
Status: DISPENSED
Start: 2022-05-26

## (undated) RX ORDER — DEXAMETHASONE SODIUM PHOSPHATE 10 MG/ML
INJECTION INTRAMUSCULAR; INTRAVENOUS
Status: DISPENSED
Start: 2022-05-26

## (undated) RX ORDER — BUPIVACAINE HYDROCHLORIDE 5 MG/ML
INJECTION, SOLUTION PERINEURAL
Status: DISPENSED
Start: 2022-05-26

## (undated) RX ORDER — FENTANYL CITRATE 50 UG/ML
INJECTION, SOLUTION INTRAMUSCULAR; INTRAVENOUS
Status: DISPENSED
Start: 2022-05-26